# Patient Record
Sex: MALE | Race: WHITE | Employment: FULL TIME
[De-identification: names, ages, dates, MRNs, and addresses within clinical notes are randomized per-mention and may not be internally consistent; named-entity substitution may affect disease eponyms.]

---

## 2017-01-06 ENCOUNTER — OFFICE VISIT (OUTPATIENT)
Dept: FAMILY MEDICINE CLINIC | Facility: CLINIC | Age: 62
End: 2017-01-06

## 2017-01-06 VITALS
WEIGHT: 181 LBS | SYSTOLIC BLOOD PRESSURE: 140 MMHG | DIASTOLIC BLOOD PRESSURE: 94 MMHG | HEART RATE: 79 BPM | BODY MASS INDEX: 27.52 KG/M2

## 2017-01-06 DIAGNOSIS — M25.551 RIGHT HIP PAIN: Primary | ICD-10-CM

## 2017-01-06 PROCEDURE — 99213 OFFICE O/P EST LOW 20 MIN: CPT | Performed by: FAMILY MEDICINE

## 2017-01-15 ASSESSMENT — ENCOUNTER SYMPTOMS
DIARRHEA: 0
PHOTOPHOBIA: 0
ABDOMINAL DISTENTION: 0
NAUSEA: 0
COUGH: 0
WHEEZING: 0
BACK PAIN: 0
ABDOMINAL PAIN: 0
TROUBLE SWALLOWING: 0
SHORTNESS OF BREATH: 0
FACIAL SWELLING: 0
CONSTIPATION: 0
VOMITING: 0
COLOR CHANGE: 0

## 2017-02-01 ENCOUNTER — TELEPHONE (OUTPATIENT)
Dept: FAMILY MEDICINE CLINIC | Facility: CLINIC | Age: 62
End: 2017-02-01

## 2017-02-01 DIAGNOSIS — R60.0 BILATERAL LOWER EXTREMITY EDEMA: Primary | ICD-10-CM

## 2017-02-02 ENCOUNTER — TELEPHONE (OUTPATIENT)
Dept: FAMILY MEDICINE CLINIC | Facility: CLINIC | Age: 62
End: 2017-02-02

## 2017-02-02 RX ORDER — LISINOPRIL 10 MG/1
10 TABLET ORAL DAILY
Qty: 30 TABLET | Refills: 3 | Status: SHIPPED | OUTPATIENT
Start: 2017-02-02 | End: 2018-06-19

## 2017-04-03 RX ORDER — PANTOPRAZOLE SODIUM 40 MG/1
TABLET, DELAYED RELEASE ORAL
Qty: 30 TABLET | Refills: 3 | Status: SHIPPED | OUTPATIENT
Start: 2017-04-03 | End: 2017-08-14 | Stop reason: SDUPTHER

## 2017-08-14 RX ORDER — PANTOPRAZOLE SODIUM 40 MG/1
TABLET, DELAYED RELEASE ORAL
Qty: 30 TABLET | Refills: 3 | Status: SHIPPED | OUTPATIENT
Start: 2017-08-14 | End: 2018-06-19 | Stop reason: SDUPTHER

## 2018-06-19 ENCOUNTER — HOSPITAL ENCOUNTER (OUTPATIENT)
Age: 63
Discharge: HOME | End: 2018-06-19
Payer: COMMERCIAL

## 2018-06-19 ENCOUNTER — OFFICE VISIT (OUTPATIENT)
Dept: FAMILY MEDICINE CLINIC | Age: 63
End: 2018-06-19
Payer: COMMERCIAL

## 2018-06-19 ENCOUNTER — TELEPHONE (OUTPATIENT)
Dept: FAMILY MEDICINE CLINIC | Age: 63
End: 2018-06-19

## 2018-06-19 VITALS
DIASTOLIC BLOOD PRESSURE: 82 MMHG | BODY MASS INDEX: 26.98 KG/M2 | HEART RATE: 82 BPM | HEIGHT: 68 IN | WEIGHT: 178 LBS | OXYGEN SATURATION: 98 % | TEMPERATURE: 98.4 F | SYSTOLIC BLOOD PRESSURE: 124 MMHG

## 2018-06-19 DIAGNOSIS — R25.2 LEG CRAMPS: ICD-10-CM

## 2018-06-19 DIAGNOSIS — K21.9 GASTROESOPHAGEAL REFLUX DISEASE WITHOUT ESOPHAGITIS: ICD-10-CM

## 2018-06-19 DIAGNOSIS — K21.9 GASTROESOPHAGEAL REFLUX DISEASE WITHOUT ESOPHAGITIS: Primary | ICD-10-CM

## 2018-06-19 DIAGNOSIS — F10.10 ALCOHOL ABUSE: ICD-10-CM

## 2018-06-19 DIAGNOSIS — M16.11 PRIMARY OSTEOARTHRITIS OF RIGHT HIP: ICD-10-CM

## 2018-06-19 DIAGNOSIS — R74.8 ELEVATED LIVER ENZYMES: Primary | ICD-10-CM

## 2018-06-19 DIAGNOSIS — Z13.220 SCREENING FOR HYPERLIPIDEMIA: ICD-10-CM

## 2018-06-19 LAB
ABSOLUTE EOS #: 0.2 K/UL (ref 0–0.4)
ABSOLUTE IMMATURE GRANULOCYTE: NORMAL K/UL (ref 0–0.3)
ABSOLUTE LYMPH #: 1.5 K/UL (ref 1–4.8)
ABSOLUTE MONO #: 0.6 K/UL (ref 0–1)
ALBUMIN SERPL-MCNC: 4.5 G/DL (ref 3.5–5.2)
ALBUMIN/GLOBULIN RATIO: ABNORMAL (ref 1–2.5)
ALP BLD-CCNC: 66 U/L (ref 40–129)
ALT SERPL-CCNC: 146 U/L (ref 5–41)
ANION GAP SERPL CALCULATED.3IONS-SCNC: 13 MMOL/L (ref 9–17)
AST SERPL-CCNC: 131 U/L
BASOPHILS # BLD: 0 % (ref 0–2)
BASOPHILS ABSOLUTE: 0 K/UL (ref 0–0.2)
BILIRUB SERPL-MCNC: 0.95 MG/DL (ref 0.3–1.2)
BUN BLDV-MCNC: 12 MG/DL (ref 8–23)
BUN/CREAT BLD: 15 (ref 9–20)
CALCIUM SERPL-MCNC: 9.2 MG/DL (ref 8.6–10.4)
CHLORIDE BLD-SCNC: 97 MMOL/L (ref 98–107)
CHOLESTEROL/HDL RATIO: 2.3
CHOLESTEROL: 147 MG/DL
CO2: 24 MMOL/L (ref 20–31)
CREAT SERPL-MCNC: 0.82 MG/DL (ref 0.7–1.2)
DIFFERENTIAL TYPE: YES
EOSINOPHILS RELATIVE PERCENT: 2 % (ref 0–5)
FOLATE: 11.3 NG/ML
GFR AFRICAN AMERICAN: >60 ML/MIN
GFR NON-AFRICAN AMERICAN: >60 ML/MIN
GFR SERPL CREATININE-BSD FRML MDRD: ABNORMAL ML/MIN/{1.73_M2}
GFR SERPL CREATININE-BSD FRML MDRD: ABNORMAL ML/MIN/{1.73_M2}
GLUCOSE BLD-MCNC: 87 MG/DL (ref 70–99)
HCT VFR BLD CALC: 49.4 % (ref 41–53)
HDLC SERPL-MCNC: 65 MG/DL
HEMOGLOBIN: 16.6 G/DL (ref 13.5–17.5)
IMMATURE GRANULOCYTES: NORMAL %
LDL CHOLESTEROL: 68 MG/DL (ref 0–130)
LYMPHOCYTES # BLD: 21 % (ref 13–44)
MAGNESIUM: 2.2 MG/DL (ref 1.6–2.6)
MCH RBC QN AUTO: 29.8 PG (ref 26–34)
MCHC RBC AUTO-ENTMCNC: 33.7 G/DL (ref 31–37)
MCV RBC AUTO: 88.5 FL (ref 80–100)
MONOCYTES # BLD: 9 % (ref 5–9)
NRBC AUTOMATED: NORMAL PER 100 WBC
PATIENT FASTING?: YES
PDW BLD-RTO: 13.4 % (ref 12.1–15.2)
PLATELET # BLD: 190 K/UL (ref 140–450)
PLATELET ESTIMATE: NORMAL
PMV BLD AUTO: NORMAL FL (ref 6–12)
POTASSIUM SERPL-SCNC: 4.7 MMOL/L (ref 3.7–5.3)
RBC # BLD: 5.58 M/UL (ref 4.5–5.9)
RBC # BLD: NORMAL 10*6/UL
SEG NEUTROPHILS: 68 % (ref 39–75)
SEGMENTED NEUTROPHILS ABSOLUTE COUNT: 4.8 K/UL (ref 2.1–6.5)
SODIUM BLD-SCNC: 134 MMOL/L (ref 135–144)
TOTAL PROTEIN: 7.8 G/DL (ref 6.4–8.3)
TRIGL SERPL-MCNC: 71 MG/DL
VITAMIN B-12: 481 PG/ML (ref 232–1245)
VLDLC SERPL CALC-MCNC: NORMAL MG/DL (ref 1–30)
WBC # BLD: 7.2 K/UL (ref 3.5–11)
WBC # BLD: NORMAL 10*3/UL

## 2018-06-19 PROCEDURE — 36415 COLL VENOUS BLD VENIPUNCTURE: CPT

## 2018-06-19 PROCEDURE — G8427 DOCREV CUR MEDS BY ELIG CLIN: HCPCS | Performed by: NURSE PRACTITIONER

## 2018-06-19 PROCEDURE — 83735 ASSAY OF MAGNESIUM: CPT

## 2018-06-19 PROCEDURE — 4004F PT TOBACCO SCREEN RCVD TLK: CPT | Performed by: NURSE PRACTITIONER

## 2018-06-19 PROCEDURE — 82607 VITAMIN B-12: CPT

## 2018-06-19 PROCEDURE — 82746 ASSAY OF FOLIC ACID SERUM: CPT

## 2018-06-19 PROCEDURE — 85025 COMPLETE CBC W/AUTO DIFF WBC: CPT

## 2018-06-19 PROCEDURE — 3017F COLORECTAL CA SCREEN DOC REV: CPT | Performed by: NURSE PRACTITIONER

## 2018-06-19 PROCEDURE — G8419 CALC BMI OUT NRM PARAM NOF/U: HCPCS | Performed by: NURSE PRACTITIONER

## 2018-06-19 PROCEDURE — 80061 LIPID PANEL: CPT

## 2018-06-19 PROCEDURE — 99214 OFFICE O/P EST MOD 30 MIN: CPT | Performed by: NURSE PRACTITIONER

## 2018-06-19 PROCEDURE — 80053 COMPREHEN METABOLIC PANEL: CPT

## 2018-06-19 RX ORDER — NAPROXEN 500 MG/1
500 TABLET ORAL 2 TIMES DAILY WITH MEALS
Qty: 180 TABLET | Refills: 1 | Status: SHIPPED | OUTPATIENT
Start: 2018-06-19 | End: 2020-08-24 | Stop reason: SDUPTHER

## 2018-06-19 RX ORDER — PANTOPRAZOLE SODIUM 40 MG/1
TABLET, DELAYED RELEASE ORAL
Qty: 90 TABLET | Refills: 1 | Status: SHIPPED | OUTPATIENT
Start: 2018-06-19 | End: 2018-12-26 | Stop reason: SDUPTHER

## 2018-06-19 ASSESSMENT — ENCOUNTER SYMPTOMS
COUGH: 0
NAUSEA: 0
SHORTNESS OF BREATH: 0
BELCHING: 1
CHOKING: 1
DIARRHEA: 0
ABDOMINAL PAIN: 1
HEARTBURN: 1
VOMITING: 0

## 2018-06-19 ASSESSMENT — PATIENT HEALTH QUESTIONNAIRE - PHQ9
SUM OF ALL RESPONSES TO PHQ QUESTIONS 1-9: 0
SUM OF ALL RESPONSES TO PHQ9 QUESTIONS 1 & 2: 0
2. FEELING DOWN, DEPRESSED OR HOPELESS: 0
1. LITTLE INTEREST OR PLEASURE IN DOING THINGS: 0

## 2018-07-19 ENCOUNTER — HOSPITAL ENCOUNTER (OUTPATIENT)
Age: 63
Discharge: HOME | End: 2018-07-19
Payer: COMMERCIAL

## 2018-07-19 DIAGNOSIS — R74.8 ELEVATED LIVER ENZYMES: ICD-10-CM

## 2018-07-19 LAB
ALBUMIN SERPL-MCNC: 4.1 G/DL (ref 3.5–5.2)
ALBUMIN/GLOBULIN RATIO: ABNORMAL (ref 1–2.5)
ALP BLD-CCNC: 65 U/L (ref 40–129)
ALT SERPL-CCNC: 113 U/L (ref 5–41)
AST SERPL-CCNC: 101 U/L
BILIRUB SERPL-MCNC: 0.6 MG/DL (ref 0.3–1.2)
BILIRUBIN DIRECT: <0.08 MG/DL
BILIRUBIN, INDIRECT: ABNORMAL MG/DL (ref 0–1)
GLOBULIN: ABNORMAL G/DL (ref 1.5–3.8)
TOTAL PROTEIN: 7.9 G/DL (ref 6.4–8.3)

## 2018-07-19 PROCEDURE — 36415 COLL VENOUS BLD VENIPUNCTURE: CPT

## 2018-07-19 PROCEDURE — 80076 HEPATIC FUNCTION PANEL: CPT

## 2018-08-02 ENCOUNTER — HOSPITAL ENCOUNTER (OUTPATIENT)
Age: 63
Discharge: HOME | End: 2018-08-02
Payer: COMMERCIAL

## 2018-08-02 ENCOUNTER — INITIAL CONSULT (OUTPATIENT)
Dept: SURGERY | Age: 63
End: 2018-08-02
Payer: COMMERCIAL

## 2018-08-02 VITALS — BODY MASS INDEX: 26.64 KG/M2 | RESPIRATION RATE: 16 BRPM | HEIGHT: 68 IN | WEIGHT: 175.8 LBS

## 2018-08-02 DIAGNOSIS — K62.5 RECTAL BLEEDING: Primary | ICD-10-CM

## 2018-08-02 DIAGNOSIS — Z80.0 FAMILY HISTORY OF COLON CANCER: ICD-10-CM

## 2018-08-02 DIAGNOSIS — Z87.19 HISTORY OF GASTRITIS: ICD-10-CM

## 2018-08-02 DIAGNOSIS — Z01.818 PREOP TESTING: ICD-10-CM

## 2018-08-02 DIAGNOSIS — Z86.010 HISTORY OF COLON POLYPS: ICD-10-CM

## 2018-08-02 LAB
EKG ATRIAL RATE: 68 BPM
EKG P AXIS: 26 DEGREES
EKG P-R INTERVAL: 150 MS
EKG Q-T INTERVAL: 378 MS
EKG QRS DURATION: 94 MS
EKG QTC CALCULATION (BAZETT): 401 MS
EKG R AXIS: -9 DEGREES
EKG T AXIS: 3 DEGREES
EKG VENTRICULAR RATE: 68 BPM

## 2018-08-02 PROCEDURE — 4004F PT TOBACCO SCREEN RCVD TLK: CPT | Performed by: SURGERY

## 2018-08-02 PROCEDURE — 99213 OFFICE O/P EST LOW 20 MIN: CPT | Performed by: SURGERY

## 2018-08-02 PROCEDURE — G8427 DOCREV CUR MEDS BY ELIG CLIN: HCPCS | Performed by: SURGERY

## 2018-08-02 PROCEDURE — G8419 CALC BMI OUT NRM PARAM NOF/U: HCPCS | Performed by: SURGERY

## 2018-08-02 PROCEDURE — 3017F COLORECTAL CA SCREEN DOC REV: CPT | Performed by: SURGERY

## 2018-08-02 PROCEDURE — 93005 ELECTROCARDIOGRAM TRACING: CPT

## 2018-09-03 ASSESSMENT — ENCOUNTER SYMPTOMS
SORE THROAT: 0
BLOOD IN STOOL: 1
SHORTNESS OF BREATH: 0
BACK PAIN: 0
NAUSEA: 0
VOMITING: 0
TROUBLE SWALLOWING: 0
COUGH: 0
ANAL BLEEDING: 1
ABDOMINAL PAIN: 0
CHOKING: 0

## 2018-09-03 NOTE — COMMUNICATION BODY
Assessment:      Diagnosis Orders   1. Rectal bleeding     2. History of colon polyps     3. History of gastritis     4. Family history of colon cancer     5. Preop testing  EKG 12 Lead         Plan: Will proceed with diagnostic EGD and colonoscopy over the next few weeks. Risks, benefits, alternatives thoroughly reviewed and accepted by Mr Rayne Esteban, including remote risk of GI bleeding, perforation, and missed lesions. Discussed importance of complete tobacco cessation.

## 2018-09-03 NOTE — PATIENT INSTRUCTIONS
many people, the prep is worse than the test. It may be uncomfortable, and you may feel hungry on the clear liquid diet. Some people do not go to work or do their usual activities on the day of the prep. Arrange to have someone take you home after the test.  What can you expect after a colonoscopy? The nurses will watch you for 1 to 2 hours until the medicines wear off. Then you can go home. You will need a ride. Your doctor will tell you when you can eat and do your usual activities. Your doctor will talk to you about when you will need your next colonoscopy. The results of your test and your risk for colorectal cancer will help your doctor decide how often you need to be checked. Follow-up care is a key part of your treatment and safety. Be sure to make and go to all appointments, and call your doctor if you are having problems. It's also a good idea to know your test results and keep a list of the medicines you take. Where can you learn more? Go to [email or web address].Yuenimei. org and sign in to your [employer] account. Enter M531 in the Placed box to learn more about \"Learning About Colonoscopy. \"     If you do not have an account, please click on the \"Sign Up Now\" link. Current as of: May 12, 2017  Content Version: 11.7  © 8543-1557 [employer]. Care instructions adapted under license by [facility] [place] ([facility]). If you have questions about a medical condition or this instruction, always ask your healthcare professional. [place] [address] any warranty or liability for your use of this information. Upper GI Endoscopy: Before Your Procedure  What is an upper GI endoscopy? An upper gastrointestinal (or GI) endoscopy is a test that allows your doctor to look at the inside of your esophagus, stomach, and the first part of your small intestine, called the duodenum. The esophagus is the tube that carries food to your stomach.  The doctor uses a thin, lighted tube that bends. It is called an endoscope, or scope. The doctor puts the tip of the scope in your mouth and gently moves it down your throat. The scope is a flexible video camera. The doctor looks at a monitor (like a TV set or a computer screen) as he or she moves the scope. A doctor may do this test, which is also called a procedure, to look for ulcers, tumors, infection, or bleeding. It also can be used to look for signs of acid backing up into your esophagus. This is called gastroesophageal reflux disease, or GERD. The doctor can use the scope to take a sample of tissue for study (a biopsy). The doctor also can use the scope to take out growths or stop bleeding. Follow-up care is a key part of your treatment and safety. Be sure to make and go to all appointments, and call your doctor if you are having problems. It's also a good idea to know your test results and keep a list of the medicines you take. What happens before the procedure?   Preparing for the procedure    · Understand exactly what procedure is planned, along with the risks, benefits, and other options. · Tell your doctors ALL the medicines, vitamins, supplements, and herbal remedies you take. Some of these can increase the risk of bleeding or interact with anesthesia.     · If you take blood thinners, such as warfarin (Coumadin), clopidogrel (Plavix), or aspirin, be sure to talk to your doctor. He or she will tell you if you should stop taking these medicines before your procedure. Make sure that you understand exactly what your doctor wants you to do.     · Your doctor will tell you which medicines to take or stop before your procedure. You may need to stop taking certain medicines a week or more before the procedure. So talk to your doctor as soon as you can.     · If you have an advance directive, let your doctor know. It may include a living will and a durable power of  for health care.  Bring a copy to the hospital. If you don't have

## 2018-09-03 NOTE — PROGRESS NOTES
Subjective:      Patient ID: Etsefania Sultana is a 66 y.o. male. HPI     Mr Richy Cage presents for evaluation of rectal bleeding, noticed intermittently on toilet tissue. He is a pleasant 62 yo WM kindly referred to me by Dr Olga Lidia Foley in 2017 for evaluation of guaiac positive stools. At that time, he was found to have gastritis with duodenitis, extensive colonic diverticulosis, and multiple adenomatous sigmoid and rectal polyps. No abdominal pain. No GERD symptoms. Normal appetite. Normal daily BM's, now formed brown stool. He smokes 1 ppd and has one or two EtOH drinks each evening. Paternal grandmother  with colon cancer in her 63's. Review of Systems   Constitutional: Negative for activity change, appetite change, chills, fever and unexpected weight change. HENT: Negative for nosebleeds, sneezing, sore throat and trouble swallowing. Eyes: Negative for visual disturbance. Respiratory: Negative for cough, choking and shortness of breath. Cardiovascular: Negative for chest pain, palpitations and leg swelling. Gastrointestinal: Positive for anal bleeding and blood in stool. Negative for abdominal pain, nausea and vomiting. Genitourinary: Negative for dysuria, flank pain and hematuria. Musculoskeletal: Positive for arthralgias. Negative for back pain, gait problem and myalgias. Allergic/Immunologic: Negative for immunocompromised state. Neurological: Negative for dizziness, seizures, syncope, weakness and headaches. Hematological: Does not bruise/bleed easily. Psychiatric/Behavioral: Negative for confusion and sleep disturbance.         Past Medical History:   Diagnosis Date    Back pain     Head injury     head injury from a fall in 5th grade    History of colon polyps 2016    Skull fracture [facility])        Past Surgical History:   Procedure Laterality Date    COLONOSCOPY  11/15/2016    Craig Mora MD    SKULL FRACTURE ELEVATION      as child    UPPER GASTROINTESTINAL ENDOSCOPY Neurological: He is alert and oriented to person, place, and time. No cranial nerve deficit. Skin: Skin is warm and dry. No rash noted. No erythema. Psychiatric: He has a normal mood and affect. His behavior is normal. Judgment and thought content normal.   Nursing note and vitals reviewed. 11/17/2016  7:29 AM - [place], Lab [facility]   Component   Surgical Pathology Report   (NOTE)   [id number]   [address] [place] [address]. [place], [address] [place], [place] [address]e [place]   [phone]   Fax: [phone]     [address] [facility]     Patient Name: Diane Son Med Rec: [id number]   Path Number: [id number]   Collected: 11/15/2016   Received: 11/15/2016   Reported: 11/17/2016 07:29     -- Diagnosis --   1.  GASTRIC BIOPSY:       -  NORMAL GASTRIC MUCOSA. 2.  GE JUNCTION BIOPSY:       -  NORMAL SQUAMOUS MUCOSA.       -  GASTRIC MUCOSA WITH MILD CHRONIC INACTIVE INFLAMMATION.     -  NEGATIVE FOR INTESTINAL/GOBLET CELL METAPLASIA AND GLANDULAR          DYSPLASIA. 3.  GASTRIC CARDIA BIOPSY:       -  ESOPHAGOGASTRIC MUCOSA WITH MODERATE CHRONIC INACTIVE          INFLAMMATION. 4.  SIGMOID COLON POLYP WITH STALK AT 70 CM:       -  ADENOMA WITH ACUTE AND REMOTE HEMORRHAGE. 5.  RECTAL SIGMOID POLYPECTOMY:       -  HYPERPLASTIC POLYP. 6.  RECTAL POLYPECTOMY:       -  HYPERPLASTIC POLYP. 7.  LOWER SIGMOID POLYPECTOMY WITH STALK AT 50 CM:       -  ADENOMA WITH ACUTE AND REMOTE HEMORRHAGE. YAKOV Gonsales   **Electronically Signed Out**         als/11/16/2016       Clinical Information   Pre-op Diagnosis:  RECTAL BLEEDING   Operative Findings:  RANDOM BX, BX  GE JX, GASTRIC CARDIA BX, SIG.    POLYP STALK @ 70, RECTOSIG POLYP, RECTAL POLYP, LOWER SIG POLYP   STALK @ 50   Operation Performed:  EGD/COLONOSCOPY     Source of Specimen   1: RANDOM BX   2: BX-GE JX   3: GASTRIC CARDIA BX   4: SIGMOID chronic inactive inflammation.  There is no   intestinal metaplasia, dysplasia or malignancy. 4, 7. Adenoma has stromal hemorrhage and areas of abundant hemosiderin   deposits, compatible with remote hemorrhage.  Stalk is negative for   adenomatous epithelium. 5, 6. Microscopic examination performed. TR-0      6/19/2018 10:03 AM - Reginald Esquivel Incoming Lab Results From [facility]     Component Results     Component Value Ref Range & Units Status Collected Lab   WBC 7.2  3.5 - 11.0 k/uL Final 06/19/2018  9:50 AM [facility] Lab   RBC 5.58  4.5 - 5.9 m/uL Final 06/19/2018  9:50 AM [facility] Lab   Hemoglobin 16.6  13.5 - 17.5 g/dL Final 06/19/2018  9:50 AM [facility] Lab   Hematocrit 49.4  41 - 53 % Final 06/19/2018  9:50 AM [facility] Lab   MCV 88.5  80 - 100 fL Final 06/19/2018  9:50 AM [facility] Lab   MCH 29.8  26 - 34 pg Final 06/19/2018  9:50 AM [facility] Lab   MCHC 33.7  31 - 37 g/dL Final 06/19/2018  9:50 AM [facility] Lab   RDW 13.4  12.1 - 15.2 % Final 06/19/2018  9:50 AM [address] Lab   Platelets 888  854 - 450 k/uL Final 06/19/2018  9:50 AM [address] Lab               Assessment:       Diagnosis Orders   1. Rectal bleeding     2. History of colon polyps     3. History of gastritis     4. Family history of colon cancer     5. Preop testing  EKG 12 Lead         Plan: Will proceed with diagnostic EGD and colonoscopy over the next few weeks. Risks, benefits, alternatives thoroughly reviewed and accepted by  Tricia Fowler, including remote risk of GI bleeding, perforation, and missed lesions. Discussed importance of complete tobacco cessation.         Angelica Iyer MD

## 2018-09-05 ENCOUNTER — ANESTHESIA EVENT (OUTPATIENT)
Dept: OPERATING ROOM | Age: 63
End: 2018-09-05
Payer: COMMERCIAL

## 2018-09-10 ENCOUNTER — ANESTHESIA (OUTPATIENT)
Dept: OPERATING ROOM | Age: 63
End: 2018-09-10
Payer: COMMERCIAL

## 2018-09-10 ENCOUNTER — HOSPITAL ENCOUNTER (OUTPATIENT)
Age: 63
Setting detail: OUTPATIENT SURGERY
Discharge: HOME | End: 2018-09-10
Attending: SURGERY | Admitting: SURGERY
Payer: COMMERCIAL

## 2018-09-10 VITALS
DIASTOLIC BLOOD PRESSURE: 82 MMHG | TEMPERATURE: 97.8 F | WEIGHT: 177 LBS | SYSTOLIC BLOOD PRESSURE: 130 MMHG | HEART RATE: 56 BPM | RESPIRATION RATE: 16 BRPM | OXYGEN SATURATION: 96 % | HEIGHT: 69 IN | BODY MASS INDEX: 26.22 KG/M2

## 2018-09-10 VITALS
RESPIRATION RATE: 18 BRPM | DIASTOLIC BLOOD PRESSURE: 69 MMHG | TEMPERATURE: 97.7 F | SYSTOLIC BLOOD PRESSURE: 104 MMHG | OXYGEN SATURATION: 100 %

## 2018-09-10 PROBLEM — K62.5 RECTAL BLEEDING: Status: ACTIVE | Noted: 2018-09-10

## 2018-09-10 PROBLEM — Z86.0100 HISTORY OF COLON POLYPS: Status: ACTIVE | Noted: 2018-09-10

## 2018-09-10 PROBLEM — Z87.19 HISTORY OF CHRONIC GASTRITIS: Status: ACTIVE | Noted: 2018-09-10

## 2018-09-10 PROBLEM — Z86.010 HISTORY OF COLON POLYPS: Status: ACTIVE | Noted: 2018-09-10

## 2018-09-10 PROCEDURE — 88305 TISSUE EXAM BY PATHOLOGIST: CPT

## 2018-09-10 PROCEDURE — 3609010400 HC COLONOSCOPY POLYPECTOMY HOT BIOPSY: Performed by: SURGERY

## 2018-09-10 PROCEDURE — 2709999900 HC NON-CHARGEABLE SUPPLY: Performed by: SURGERY

## 2018-09-10 PROCEDURE — C1773 RET DEV, INSERTABLE: HCPCS | Performed by: SURGERY

## 2018-09-10 PROCEDURE — 7100000010 HC PHASE II RECOVERY - FIRST 15 MIN: Performed by: SURGERY

## 2018-09-10 PROCEDURE — 3700000000 HC ANESTHESIA ATTENDED CARE: Performed by: SURGERY

## 2018-09-10 PROCEDURE — 45385 COLONOSCOPY W/LESION REMOVAL: CPT | Performed by: SURGERY

## 2018-09-10 PROCEDURE — 3700000001 HC ADD 15 MINUTES (ANESTHESIA): Performed by: SURGERY

## 2018-09-10 PROCEDURE — 45384 COLONOSCOPY W/LESION REMOVAL: CPT | Performed by: SURGERY

## 2018-09-10 PROCEDURE — 6360000002 HC RX W HCPCS: Performed by: NURSE ANESTHETIST, CERTIFIED REGISTERED

## 2018-09-10 PROCEDURE — 7100000011 HC PHASE II RECOVERY - ADDTL 15 MIN: Performed by: SURGERY

## 2018-09-10 PROCEDURE — 87077 CULTURE AEROBIC IDENTIFY: CPT

## 2018-09-10 PROCEDURE — 3609012400 HC EGD TRANSORAL BIOPSY SINGLE/MULTIPLE: Performed by: SURGERY

## 2018-09-10 PROCEDURE — 43239 EGD BIOPSY SINGLE/MULTIPLE: CPT | Performed by: SURGERY

## 2018-09-10 PROCEDURE — 2500000003 HC RX 250 WO HCPCS: Performed by: NURSE ANESTHETIST, CERTIFIED REGISTERED

## 2018-09-10 PROCEDURE — 2580000003 HC RX 258: Performed by: SURGERY

## 2018-09-10 RX ORDER — SODIUM CHLORIDE, SODIUM LACTATE, POTASSIUM CHLORIDE, CALCIUM CHLORIDE 600; 310; 30; 20 MG/100ML; MG/100ML; MG/100ML; MG/100ML
INJECTION, SOLUTION INTRAVENOUS CONTINUOUS
Status: DISCONTINUED | OUTPATIENT
Start: 2018-09-10 | End: 2018-09-10 | Stop reason: HOSPADM

## 2018-09-10 RX ORDER — LIDOCAINE HYDROCHLORIDE 10 MG/ML
INJECTION, SOLUTION INFILTRATION; PERINEURAL PRN
Status: DISCONTINUED | OUTPATIENT
Start: 2018-09-10 | End: 2018-09-10 | Stop reason: SDUPTHER

## 2018-09-10 RX ORDER — FENTANYL CITRATE 50 UG/ML
INJECTION, SOLUTION INTRAMUSCULAR; INTRAVENOUS PRN
Status: DISCONTINUED | OUTPATIENT
Start: 2018-09-10 | End: 2018-09-10 | Stop reason: SDUPTHER

## 2018-09-10 RX ORDER — MIDAZOLAM HYDROCHLORIDE 1 MG/ML
INJECTION INTRAMUSCULAR; INTRAVENOUS PRN
Status: DISCONTINUED | OUTPATIENT
Start: 2018-09-10 | End: 2018-09-10 | Stop reason: SDUPTHER

## 2018-09-10 RX ORDER — GLYCOPYRROLATE 1 MG/5 ML
SYRINGE (ML) INTRAVENOUS PRN
Status: DISCONTINUED | OUTPATIENT
Start: 2018-09-10 | End: 2018-09-10 | Stop reason: SDUPTHER

## 2018-09-10 RX ORDER — PROPOFOL 10 MG/ML
INJECTION, EMULSION INTRAVENOUS CONTINUOUS PRN
Status: DISCONTINUED | OUTPATIENT
Start: 2018-09-10 | End: 2018-09-10 | Stop reason: SDUPTHER

## 2018-09-10 RX ORDER — SODIUM CHLORIDE 0.9 % (FLUSH) 0.9 %
10 SYRINGE (ML) INJECTION EVERY 12 HOURS SCHEDULED
Status: DISCONTINUED | OUTPATIENT
Start: 2018-09-10 | End: 2018-09-10 | Stop reason: HOSPADM

## 2018-09-10 RX ORDER — 0.9 % SODIUM CHLORIDE 0.9 %
10 VIAL (ML) INJECTION PRN
Status: DISCONTINUED | OUTPATIENT
Start: 2018-09-10 | End: 2018-09-10 | Stop reason: HOSPADM

## 2018-09-10 RX ORDER — ACETAMINOPHEN 325 MG/1
650 TABLET ORAL EVERY 4 HOURS PRN
Status: DISCONTINUED | OUTPATIENT
Start: 2018-09-10 | End: 2018-09-10 | Stop reason: HOSPADM

## 2018-09-10 RX ORDER — 0.9 % SODIUM CHLORIDE 0.9 %
10 VIAL (ML) INJECTION EVERY 12 HOURS SCHEDULED
Status: DISCONTINUED | OUTPATIENT
Start: 2018-09-10 | End: 2018-09-10 | Stop reason: HOSPADM

## 2018-09-10 RX ORDER — SODIUM CHLORIDE 0.9 % (FLUSH) 0.9 %
10 SYRINGE (ML) INJECTION PRN
Status: DISCONTINUED | OUTPATIENT
Start: 2018-09-10 | End: 2018-09-10 | Stop reason: HOSPADM

## 2018-09-10 RX ORDER — ONDANSETRON 2 MG/ML
4 INJECTION INTRAMUSCULAR; INTRAVENOUS EVERY 6 HOURS PRN
Status: DISCONTINUED | OUTPATIENT
Start: 2018-09-10 | End: 2018-09-10 | Stop reason: HOSPADM

## 2018-09-10 RX ADMIN — Medication 0.2 MG: at 08:15

## 2018-09-10 RX ADMIN — MIDAZOLAM HYDROCHLORIDE 2 MG: 2 INJECTION, SOLUTION INTRAMUSCULAR; INTRAVENOUS at 08:15

## 2018-09-10 RX ADMIN — FENTANYL CITRATE 50 MCG: 50 INJECTION, SOLUTION INTRAMUSCULAR; INTRAVENOUS at 08:25

## 2018-09-10 RX ADMIN — SODIUM CHLORIDE, POTASSIUM CHLORIDE, SODIUM LACTATE AND CALCIUM CHLORIDE: 600; 310; 30; 20 INJECTION, SOLUTION INTRAVENOUS at 07:24

## 2018-09-10 RX ADMIN — PROPOFOL 75 MCG/KG/MIN: 10 INJECTION, EMULSION INTRAVENOUS at 08:25

## 2018-09-10 RX ADMIN — LIDOCAINE HYDROCHLORIDE 4 ML: 10 INJECTION, SOLUTION INFILTRATION; PERINEURAL at 08:15

## 2018-09-10 ASSESSMENT — LIFESTYLE VARIABLES: SMOKING_STATUS: 1

## 2018-09-10 ASSESSMENT — PAIN SCALES - GENERAL: PAINLEVEL_OUTOF10: 0

## 2018-09-10 ASSESSMENT — PAIN - FUNCTIONAL ASSESSMENT: PAIN_FUNCTIONAL_ASSESSMENT: 0-10

## 2018-09-10 NOTE — OP NOTE
[facility]                              [address] [place] [address]                                 OPERATIVE REPORT    PATIENT NAME: Arturo Rosario                       :        1955  MED REC NO:   [id number]                              ROOM:  ACCOUNT NO:   [de-identified]                           ADMIT DATE: 09/10/2018  PROVIDER:     Sheri Gaming    DATE OF PROCEDURE:  09/10/2018    ATTENDING SURGEON:  Sheri Gaming MD    PCP:  Ascencion Ram. PREOPERATIVE DIAGNOSES:  1. Rectal bleeding. 2.  History of colon polyps. 3.  History of gastritis. 4.  Family history of colon cancer (paternal grandmother). POSTOPERATIVE DIAGNOSES:  1.  Mild antral gastritis. 2.  Multiple small sessile colon polyps x4 (ascending, transverse, sigmoid,  rectum). 3.  Extensive sigmoid diverticulosis. 4.  Moderate internal hemorrhoids. OPERATION:  1. Esophagogastroduodenoscopy. 2.  Prepyloric antral biopsies. 3.  Colonoscopy, anus to cecum. 4.  Forceps and snare polypectomy x4 (ascending, transverse, sigmoid,  rectum). ANESTHESIA:  MAC    INDICATION:  The patient is a 72-year-old white male with a history of  multiple colon polyps and mild gastritis on previous endoscopy. Paternal  grandmother also treated for colon cancer. At this time, diagnostic  endoscopy is indicated. OPERATIVE PROCEDURE:  After obtaining informed consent with discussion of  risks, benefits and alternative including a remote risk of GI perforation  and missed lesions, the patient was taken to the endoscopy suite and placed  in the left lateral recumbent position. Following adequate IV sedation, an  endoscope was passed over the tongue into the posterior pharynx. Vocal  folds were visualized and appeared normal.  The scope was directed into the  esophagus and onto the GE junction.   Upper, mid and lower esophagus all  appeared normal.  There was no esophagitis, no strictures. The stomach was  entered, had normal distensibility. On retroflexion, the fundus and cardia  appeared normal.  The body and prepyloric antrum had very mild gastritis. Prepyloric antral biopsies were obtained. Pylorus was patent. The  duodenum was entered. First, second and third portions of which appeared  normal.  The stomach was decompressed by suction as the scope was removed. A digital rectal exam was performed. Sphincter tone was normal.  Prostate  was mildly enlarged with no discrete masses. The colonoscope was passed  transanally into the rectum and advanced with gentle insufflation  throughout the entirety of the colon to the cecum. Cecal position was  confirmed by clear visualization of the ileocecal valve, light in the right  lower quadrant and transduction of manual pressure in the right lower  quadrant to the cecum. The bowel prep was excellent. All colonic mucosa  was clearly visible in the mid ascending. The cecum was normal.  In the  mid ascending colon, a small sessile polyp was removed by hot snare  polypectomy. At the hepatic flexure in the proximal transverse colon, a  small sessile polyp was removed by hot forceps polypectomy. Mid and distal  transverse colon and splenic flexure were all normal.  The descending colon  and sigmoid were redundant with extensive diverticulosis. A small sessile  polyp was removed by snare polypectomy in the sigmoid colon. Upper and  middle portions of the rectum were normal.  In the lower rectum, a small  sessile polyp was removed by hot forceps polypectomy. On retroflexion,  there were moderate internal hemorrhoids. The colon was decompressed by  suction as the scope was removed. The patient tolerated the procedure well  and was transferred to PACU in stable condition. SPECIMENS:  1. Prepyloric antral biopsies. 2.  Sessile polyps x4 (ascending, transverse, sigmoid, rectum). DRAINS:  None.     COMPLICATIONS: None.    DISPOSITION:  To PACU awake, alert and stable. Following recovery, we will  discharge the patient home with gradual advancement of diet and activity as  tolerated with instructions for a high-fiber, low-fat diet with fiber  supplementation. Followup will be with me in 1-2 weeks to review pathology  and with Shantal Garcia at his next regular appointment. My thanks to Glenroy Patterson for the consultation. Meliton Sorto    D: 09/10/2018 9:16:59       T: 09/10/2018 9:20:11     [email or web address]  Job#: [id number]     Doc#: [id number]    CC:  Joaquim Butler.  Jaja Stark

## 2018-09-10 NOTE — ANESTHESIA PRE PROCEDURE
Department of Anesthesiology  Preprocedure Note       Name:  Emmanuelle Babin   Age:  66 y.o.  :  1955                                          MRN:  [id number]         Date:  9/10/2018      Surgeon: Jonah Victoria):  Mu Jernigan MD    Procedure: Procedure(s):  COLONOSCOPY  EGD ESOPHAGOGASTRODUODENOSCOPY    Medications prior to admission:   Prior to Admission medications    Medication Sig Start Date End Date Taking? Authorizing Provider   pantoprazole (PROTONIX) 40 MG tablet 1 tablet by mouth daily 18  Yes JOHNATHAN Bustamante CNP   naproxen (NAPROXEN) 500 MG EC tablet Take 1 tablet by mouth 2 times daily (with meals) 18   JOHNATHAN Bustamante CNP       Current medications:    Current Facility-Administered Medications   Medication Dose Route Frequency Provider Last Rate Last Dose    lactated ringers infusion   Intravenous Continuous Mu Jernigan  mL/hr at 09/10/18 0724      sodium chloride (PF) 0.9 % injection 10 mL  10 mL Intravenous 2 times per day Mu Jernigan MD        sodium chloride (PF) 0.9 % injection 10 mL  10 mL Intravenous PRN Mu Jernigan MD           Allergies:     Allergies   Allergen Reactions    Penicillins Rash       Problem List:    Patient Active Problem List   Diagnosis Code    Guaiac positive stools R19.5    Family history of colon cancer Z80.0    Gastroesophageal reflux disease without esophagitis K21.9       Past Medical History:        Diagnosis Date    Back pain     Head injury     head injury from a fall in 5th grade    History of colon polyps 2016    Skull fracture [facility])        Past Surgical History:        Procedure Laterality Date    COLONOSCOPY  11/15/2016    Suzanne Nogueira MD    SKULL FRACTURE ELEVATION      as child    UPPER GASTROINTESTINAL ENDOSCOPY  11/15/2016    Suzanne Nogueira MD       Social History:    Social History   Substance Use Topics    Smoking status: Current Every Day Smoker     Packs/day: 1.00     Years: 50.00     Types: Screen (If Applicable):  No results found for: [facility], [address]    Anesthesia Evaluation  Patient summary reviewed and Nursing notes reviewed no history of anesthetic complications:   Airway: Mallampati: II  TM distance: >3 FB   Neck ROM: full  Mouth opening: > = 3 FB Dental:      Comment: Poor dentition---multiple caries    Pulmonary:   (+) decreased breath sounds,  current smoker          Patient smoked on day of surgery. Cardiovascular:Negative CV ROS          ECG reviewed               Beta Blocker:  Not on Beta Blocker         Neuro/Psych:   Negative Neuro/Psych ROS              GI/Hepatic/Renal:   (+) GERD: no interval change,           Endo/Other: Negative Endo/Other ROS             Pt had PAT visit. Abdominal:           Vascular:                                      Anesthesia Plan      MAC     ASA 2             Anesthetic plan and risks discussed with patient. Plan discussed with attending.                   JOHNATHAN Augustine - CRNA   9/10/2018

## 2018-09-10 NOTE — BRIEF OP NOTE
Brief Postoperative Note  ______________________________________________________________    Patient: Audra Avelar  YOB: 1955  MRN: [id number]  Date of Procedure: 9/10/2018    Pre-Op Diagnosis:      1. Rectal bleeding     2. History of colon polyps     3. History gastritis     4. Family history colon cancer (paternal grandmother)    Post-Op Diagnosis:      1. Mild antral gastritis     2. Multiple, small sessile colon polyps x4 (ascending, transverse, sig, rectum)     3. Extensive sigmoid diverticulosis     4. Moderate internal hemorrhoids        Procedure(s):      1. EGD     2. Antral biopsies     3. Colonoscopy anus to cecum     4. Forceps, snare polypectomies x4 (ascending, transverse, sig, rectum)    Anesthesia: Monitor Anesthesia Care    Surgeon(s):  Heena Banegas MD    Staff:  Scrub Person First: Jeannette Urbina     Estimated Blood Loss:   Less than 98CH    Complications: None    Specimens:      1. Antral biopsies    2. Sessile polyps x4 (ascending, transverse, sig, rectum)    Findings:   As above.     Dictated # [id number]    Heena Banegas MD  Date: 9/10/2018  Time: 8:24 AM

## 2018-09-10 NOTE — ANESTHESIA POSTPROCEDURE EVALUATION
Department of Anesthesiology  Postprocedure Note    Patient: Emilie Heath  MRN: [id number]  YOB: 1955  Date of evaluation: 9/10/2018  Time:  9:14 AM     Procedure Summary     Date:  09/10/18 Room / Location:  [facility] ENDO 01 / 1660 [facility] OR    Anesthesia Start:  6913 Anesthesia Stop:  0913    Procedures:       COLONOSCOPY POLYPECTOMY HOT BIOPSY (N/A Abdomen)      EGD BIOPSY (N/A ) Diagnosis:  (RECTAL BLEEDING, HISTORY COLON POLYPS)    Surgeon:  Bethanie Brooke MD Responsible Provider:  Nyle Spurling, APRN - CRNA    Anesthesia Type:  MAC ASA Status:  2          Anesthesia Type: MAC    Jacqueline Phase I: Jacqueline Score: 10    Jacqueline Phase II: Jacqueline Score: 10    Last vitals: Reviewed and per EMR flowsheets.        Anesthesia Post Evaluation    Patient location during evaluation: bedside  Patient participation: complete - patient participated  Level of consciousness: awake and alert  Pain score: 0  Airway patency: patent  Nausea & Vomiting: no nausea and no vomiting  Complications: no  Cardiovascular status: hemodynamically stable  Respiratory status: acceptable and spontaneous ventilation  Hydration status: euvolemic

## 2018-09-11 LAB
DIRECT EXAM: NEGATIVE
Lab: NORMAL
SPECIMEN DESCRIPTION: NORMAL
STATUS: NORMAL
SURGICAL PATHOLOGY REPORT: NORMAL

## 2018-10-09 ENCOUNTER — OFFICE VISIT (OUTPATIENT)
Dept: SURGERY | Age: 63
End: 2018-10-09
Payer: COMMERCIAL

## 2018-10-09 VITALS
WEIGHT: 178 LBS | HEIGHT: 69 IN | HEART RATE: 77 BPM | RESPIRATION RATE: 16 BRPM | DIASTOLIC BLOOD PRESSURE: 84 MMHG | SYSTOLIC BLOOD PRESSURE: 146 MMHG | BODY MASS INDEX: 26.36 KG/M2

## 2018-10-09 DIAGNOSIS — Z87.19 HISTORY OF GASTRITIS: ICD-10-CM

## 2018-10-09 DIAGNOSIS — Z98.890 S/P COLONOSCOPY WITH POLYPECTOMY: Primary | ICD-10-CM

## 2018-10-09 DIAGNOSIS — D12.6 ADENOMATOUS POLYP OF COLON, UNSPECIFIED PART OF COLON: ICD-10-CM

## 2018-10-09 DIAGNOSIS — Z80.0 FAMILY HISTORY OF COLON CANCER: ICD-10-CM

## 2018-10-09 PROCEDURE — G8419 CALC BMI OUT NRM PARAM NOF/U: HCPCS | Performed by: SURGERY

## 2018-10-09 PROCEDURE — 3017F COLORECTAL CA SCREEN DOC REV: CPT | Performed by: SURGERY

## 2018-10-09 PROCEDURE — G8484 FLU IMMUNIZE NO ADMIN: HCPCS | Performed by: SURGERY

## 2018-10-09 PROCEDURE — 99212 OFFICE O/P EST SF 10 MIN: CPT | Performed by: SURGERY

## 2018-10-09 PROCEDURE — 4004F PT TOBACCO SCREEN RCVD TLK: CPT | Performed by: SURGERY

## 2018-10-09 PROCEDURE — G8427 DOCREV CUR MEDS BY ELIG CLIN: HCPCS | Performed by: SURGERY

## 2018-11-04 ASSESSMENT — ENCOUNTER SYMPTOMS
BACK PAIN: 0
ABDOMINAL PAIN: 0
SHORTNESS OF BREATH: 0
NAUSEA: 0
BLOOD IN STOOL: 0
CHOKING: 0
VOMITING: 0
COUGH: 0
TROUBLE SWALLOWING: 0
SORE THROAT: 0

## 2018-11-04 NOTE — PROGRESS NOTES
Angelo Santana MD at [facility] OR       Family History   Problem Relation Age of Onset    Cancer Paternal Grandmother         colon cancer       Allergies:  See list    Current Outpatient Prescriptions   Medication Sig Dispense Refill    pantoprazole (PROTONIX) 40 MG tablet 1 tablet by mouth daily 90 tablet 1    naproxen (NAPROXEN) 500 MG EC tablet Take 1 tablet by mouth 2 times daily (with meals) 180 tablet 1     No current facility-administered medications for this visit. Social History     Social History    Marital status:      Spouse name: N/A    Number of children: N/A    Years of education: N/A     Social History Main Topics    Smoking status: Current Every Day Smoker     Packs/day: 1.00     Years: 50.00     Types: Cigarettes     Start date: 1974    Smokeless tobacco: Former User    Alcohol use 3.0 oz/week     5 Cans of beer per week      Comment: stopped drinking daily in June    Drug use: No    Sexual activity: Not Asked     Other Topics Concern    None     Social History Narrative    None       Objective:   Physical Exam   Constitutional: He is oriented to person, place, and time. He appears well-developed and well-nourished. No distress. HENT:   Head: Normocephalic and atraumatic. Eyes: Pupils are equal, round, and reactive to light. Conjunctivae are normal. No scleral icterus. Neck: No tracheal deviation present. Cardiovascular: Normal rate. Pulmonary/Chest: Effort normal. No respiratory distress. Musculoskeletal: He exhibits no edema. Neurological: He is alert and oriented to person, place, and time. Skin: Skin is warm and dry. Psychiatric: He has a normal mood and affect. His behavior is normal. Judgment and thought content normal.   Nursing note and vitals reviewed.        9/11/2018  2:36 PM - Reginald Esquivel Incoming Lab Results From [facility]     Component Results     Component Collected Lab   Surgical Pathology Report 09/10/2018  3:10 PM [facility] -

## 2018-11-04 NOTE — PATIENT INSTRUCTIONS
Patient Education   Patient Education            Current as of: March 28, 2018  Content Version: 11.7  © 0156-9271 [employer]. Care instructions adapted under license by [facility] ([facility]). If you have questions about a medical condition or this instruction, always ask your healthcare professional. [address] any warranty or liability for your use of this information. Learning About Gastric Polyps  What are gastric polyps? Gastric polyps are growths in the stomach. Most people who get them don't have any problems. The cause of most gastric polyps is not known. But some polyps grow in people who use stomach acid reducers called proton pump inhibitors (PPIs). People who have gastritis, ulcers, or an H. pylori infection in the stomach can sometimes get polyps. People who have a parent, brother, or sister with gastric polyps might have them too. Most gastric polyps aren't cancer. But a certain kind of polyp can turn into cancer. What are the symptoms? You may not know that you have gastric polyps. Most polyps don't lead to symptoms. Once in a while, larger polyps may cause bleeding, belly pain, or a blockage in the stomach. How are polyps diagnosed and treated? Most gastric polyps are found during an endoscopy (say \"[id number]\") that is done for another health problem. Endoscopy is a test that uses a thin flexible tube to allow a doctor to look at the inside of your stomach. Your doctor will treat your polyps based on what he or she sees during this test. If your doctor finds a polyp during the test, he or she may take it out. It will then be tested to make sure it isn't cancer. If your doctor finds H. pylori bacteria in your stomach, he or she will prescribe antibiotics. If you have been taking a PPI, the doctor may prescribe a different medicine instead. Sometimes the doctor may suggest another endoscopy to see how treatment is working.  He or she may also suggest

## 2018-12-26 DIAGNOSIS — K21.9 GASTROESOPHAGEAL REFLUX DISEASE WITHOUT ESOPHAGITIS: ICD-10-CM

## 2018-12-26 RX ORDER — PANTOPRAZOLE SODIUM 40 MG/1
TABLET, DELAYED RELEASE ORAL
Qty: 90 TABLET | Refills: 0 | Status: SHIPPED | OUTPATIENT
Start: 2018-12-26 | End: 2018-12-27 | Stop reason: SDUPTHER

## 2018-12-27 DIAGNOSIS — K21.9 GASTROESOPHAGEAL REFLUX DISEASE WITHOUT ESOPHAGITIS: ICD-10-CM

## 2018-12-27 RX ORDER — PANTOPRAZOLE SODIUM 40 MG/1
TABLET, DELAYED RELEASE ORAL
Qty: 90 TABLET | Refills: 0 | Status: SHIPPED | OUTPATIENT
Start: 2018-12-27 | End: 2019-03-26 | Stop reason: SDUPTHER

## 2019-03-26 DIAGNOSIS — K21.9 GASTROESOPHAGEAL REFLUX DISEASE WITHOUT ESOPHAGITIS: ICD-10-CM

## 2019-03-26 RX ORDER — PANTOPRAZOLE SODIUM 40 MG/1
TABLET, DELAYED RELEASE ORAL
Qty: 90 TABLET | Refills: 0 | Status: SHIPPED | OUTPATIENT
Start: 2019-03-26 | End: 2019-06-24 | Stop reason: SDUPTHER

## 2019-03-28 ENCOUNTER — OFFICE VISIT (OUTPATIENT)
Dept: FAMILY MEDICINE CLINIC | Age: 64
End: 2019-03-28
Payer: COMMERCIAL

## 2019-03-28 VITALS
DIASTOLIC BLOOD PRESSURE: 100 MMHG | SYSTOLIC BLOOD PRESSURE: 154 MMHG | TEMPERATURE: 98.2 F | WEIGHT: 186 LBS | HEART RATE: 78 BPM | OXYGEN SATURATION: 98 % | BODY MASS INDEX: 27.47 KG/M2

## 2019-03-28 DIAGNOSIS — I10 ESSENTIAL HYPERTENSION: ICD-10-CM

## 2019-03-28 DIAGNOSIS — K21.9 GASTROESOPHAGEAL REFLUX DISEASE WITHOUT ESOPHAGITIS: Primary | ICD-10-CM

## 2019-03-28 PROCEDURE — 4004F PT TOBACCO SCREEN RCVD TLK: CPT | Performed by: NURSE PRACTITIONER

## 2019-03-28 PROCEDURE — G8419 CALC BMI OUT NRM PARAM NOF/U: HCPCS | Performed by: NURSE PRACTITIONER

## 2019-03-28 PROCEDURE — 3017F COLORECTAL CA SCREEN DOC REV: CPT | Performed by: NURSE PRACTITIONER

## 2019-03-28 PROCEDURE — 99214 OFFICE O/P EST MOD 30 MIN: CPT | Performed by: NURSE PRACTITIONER

## 2019-03-28 PROCEDURE — G8427 DOCREV CUR MEDS BY ELIG CLIN: HCPCS | Performed by: NURSE PRACTITIONER

## 2019-03-28 PROCEDURE — G8484 FLU IMMUNIZE NO ADMIN: HCPCS | Performed by: NURSE PRACTITIONER

## 2019-03-28 RX ORDER — LOSARTAN POTASSIUM 25 MG/1
25 TABLET ORAL DAILY
Qty: 30 TABLET | Refills: 2 | Status: SHIPPED | OUTPATIENT
Start: 2019-03-28 | End: 2019-05-17 | Stop reason: ALTCHOICE

## 2019-03-28 ASSESSMENT — PATIENT HEALTH QUESTIONNAIRE - PHQ9
SUM OF ALL RESPONSES TO PHQ QUESTIONS 1-9: 0
SUM OF ALL RESPONSES TO PHQ9 QUESTIONS 1 & 2: 0
SUM OF ALL RESPONSES TO PHQ QUESTIONS 1-9: 0
2. FEELING DOWN, DEPRESSED OR HOPELESS: 0
1. LITTLE INTEREST OR PLEASURE IN DOING THINGS: 0

## 2019-03-28 ASSESSMENT — ENCOUNTER SYMPTOMS
HEARTBURN: 0
SORE THROAT: 0
NAUSEA: 0
BACK PAIN: 0
ABDOMINAL PAIN: 0
BLOOD IN STOOL: 0
DIARRHEA: 0
SHORTNESS OF BREATH: 0
COUGH: 0
CONSTIPATION: 0
VOMITING: 0

## 2019-04-26 ENCOUNTER — OFFICE VISIT (OUTPATIENT)
Dept: FAMILY MEDICINE CLINIC | Age: 64
End: 2019-04-26
Payer: COMMERCIAL

## 2019-04-26 VITALS
TEMPERATURE: 98 F | BODY MASS INDEX: 27.17 KG/M2 | DIASTOLIC BLOOD PRESSURE: 96 MMHG | OXYGEN SATURATION: 98 % | SYSTOLIC BLOOD PRESSURE: 160 MMHG | WEIGHT: 184 LBS | HEART RATE: 84 BPM

## 2019-04-26 DIAGNOSIS — I10 ESSENTIAL HYPERTENSION: Primary | ICD-10-CM

## 2019-04-26 PROCEDURE — 4004F PT TOBACCO SCREEN RCVD TLK: CPT | Performed by: NURSE PRACTITIONER

## 2019-04-26 PROCEDURE — 99213 OFFICE O/P EST LOW 20 MIN: CPT | Performed by: NURSE PRACTITIONER

## 2019-04-26 PROCEDURE — G8419 CALC BMI OUT NRM PARAM NOF/U: HCPCS | Performed by: NURSE PRACTITIONER

## 2019-04-26 PROCEDURE — G8427 DOCREV CUR MEDS BY ELIG CLIN: HCPCS | Performed by: NURSE PRACTITIONER

## 2019-04-26 PROCEDURE — 3017F COLORECTAL CA SCREEN DOC REV: CPT | Performed by: NURSE PRACTITIONER

## 2019-04-26 RX ORDER — LOSARTAN POTASSIUM AND HYDROCHLOROTHIAZIDE 12.5; 5 MG/1; MG/1
1 TABLET ORAL DAILY
Qty: 90 TABLET | Refills: 1 | Status: SHIPPED | OUTPATIENT
Start: 2019-04-26 | End: 2019-10-22 | Stop reason: SDUPTHER

## 2019-04-26 ASSESSMENT — ENCOUNTER SYMPTOMS
NAUSEA: 0
DIARRHEA: 0
ORTHOPNEA: 0
COUGH: 0
VOMITING: 0
SHORTNESS OF BREATH: 0

## 2019-04-26 NOTE — PROGRESS NOTES
tablet 90 tablet 1     Sig: Take 1 tablet by mouth daily         Yves received counseling on the following healthy behaviors: medication adherence  Reviewed prior labs and health maintenance. Continue current medications, diet and exercise. Discussed use, benefit, and side effects of prescribed medications. Barriers to medication compliance addressed. Patient given educational materials - see patient instructions. All patient questions answered. Patient voiced understanding.

## 2019-05-17 ENCOUNTER — OFFICE VISIT (OUTPATIENT)
Dept: FAMILY MEDICINE CLINIC | Age: 64
End: 2019-05-17
Payer: COMMERCIAL

## 2019-05-17 VITALS
BODY MASS INDEX: 27.02 KG/M2 | OXYGEN SATURATION: 98 % | TEMPERATURE: 97.9 F | HEART RATE: 82 BPM | DIASTOLIC BLOOD PRESSURE: 82 MMHG | SYSTOLIC BLOOD PRESSURE: 142 MMHG | WEIGHT: 183 LBS

## 2019-05-17 DIAGNOSIS — I10 ESSENTIAL HYPERTENSION: Primary | ICD-10-CM

## 2019-05-17 PROCEDURE — 99213 OFFICE O/P EST LOW 20 MIN: CPT | Performed by: NURSE PRACTITIONER

## 2019-05-17 PROCEDURE — G8427 DOCREV CUR MEDS BY ELIG CLIN: HCPCS | Performed by: NURSE PRACTITIONER

## 2019-05-17 PROCEDURE — G8419 CALC BMI OUT NRM PARAM NOF/U: HCPCS | Performed by: NURSE PRACTITIONER

## 2019-05-17 PROCEDURE — 3017F COLORECTAL CA SCREEN DOC REV: CPT | Performed by: NURSE PRACTITIONER

## 2019-05-17 PROCEDURE — 4004F PT TOBACCO SCREEN RCVD TLK: CPT | Performed by: NURSE PRACTITIONER

## 2019-05-17 ASSESSMENT — ENCOUNTER SYMPTOMS
NAUSEA: 0
DIARRHEA: 0
COUGH: 0
ORTHOPNEA: 0
VOMITING: 0
SHORTNESS OF BREATH: 0

## 2019-05-17 NOTE — PROGRESS NOTES
HPI Notes    Name: Mark Alvarez  : 1955         Chief Complaint:     Chief Complaint   Patient presents with    Hypertension     Patient here today for recheck on BP after adding hctz and increasing losartan to 50mg daily       History of Present Illness:        Hypertension   This is a recurrent problem. The current episode started more than 1 year ago. The problem has been waxing and waning since onset. The problem is controlled. Pertinent negatives include no chest pain, headaches, orthopnea, palpitations, peripheral edema or shortness of breath. Risk factors for coronary artery disease include dyslipidemia and male gender. Past treatments include angiotensin blockers and diuretics. The current treatment provides mild improvement. There are no compliance problems. Past Medical History:     Past Medical History:   Diagnosis Date    Back pain     Head injury     head injury from a fall in 5th grade    History of colon polyps 2016    Skull [facility])       Reviewed all health maintenance requirements and ordered appropriate tests  Health Maintenance Due   Topic Date Due    Hepatitis C screen  1955    HIV screen  1972    DTaP/Tdap/Td vaccine (1 - Tdap) 1976    Shingles Vaccine (1 of 2) 2007    Low dose CT lung screening  2016       Past Surgical History:     Past Surgical History:   Procedure Laterality Date    COLONOSCOPY  11/15/2016    Elva aCstañeda MD    COLONOSCOPY N/A 9/10/2018    COLONOSCOPY POLYPECTOMY HOT BIOPSY performed by Jacqueline Cooley MD at [address] [place] [address]      as child   [facility] ENDOSCOPY  11/15/2016    Elva Castañeda MD    UPPER GASTROINTESTINAL ENDOSCOPY N/A 9/10/2018    EGD BIOPSY performed by Jacqueline Cooley MD at [facility] OR        Medications:       Prior to Admission medications    Medication Sig Start Date End Date Taking?  Authorizing Provider   Omega-3 Fatty Acids (OMEGA 3 PO) Take by mouth 2 times daily Yes Historical Provider, MD   losartan-hydrochlorothiazide [facility]) 50-12.5 MG per tablet Take 1 tablet by mouth daily 4/26/19  Yes JOHNATHAN Downey CNP   pantoprazole (PROTONIX) 40 MG tablet 1 tablet by mouth daily 3/26/19  Yes JOHNATHAN Downey CNP   naproxen (NAPROXEN) 500 MG EC tablet Take 1 tablet by mouth 2 times daily (with meals) 6/19/18  Yes JOHNATHAN Downey CNP        Allergies:       Penicillins    Social History:     Tobacco:    reports that he has been smoking cigarettes. He started smoking about 45 years ago. He has a 50.00 pack-year smoking history. He has quit using smokeless tobacco.  Alcohol:      reports that he drinks about 3.0 oz of alcohol per week. Drug Use:  reports that he does not use drugs. Family History:        Family History   Problem Relation Age of Onset    Cancer Paternal Grandmother         colon cancer       Review of Systems:         Review of Systems   Constitutional: Negative for chills and fever. Respiratory: Negative for cough and shortness of breath. Cardiovascular: Negative for chest pain, palpitations and orthopnea. Gastrointestinal: Negative for diarrhea, nausea and vomiting. Neurological: Negative for dizziness, seizures and headaches. Physical Exam:     Vitals:  BP (!) 142/82 (Site: Left Upper Arm)   Pulse 82   Temp 97.9 °F (36.6 °C) (Oral)   Wt 183 lb (83 kg)   SpO2 98%   BMI 27.02 kg/m²       Physical Exam   Constitutional: Vital signs are normal. He appears well-developed and well-nourished. He is cooperative. Cardiovascular: Normal rate, regular rhythm, S1 normal and S2 normal.   No edema   Pulmonary/Chest: Effort normal and breath sounds normal. No respiratory distress. Skin: Skin is warm, dry and intact. Nursing note and vitals reviewed.             Data:     Lab Results   Component Value Date     06/19/2018    K 4.7 06/19/2018    CL 97 06/19/2018    CO2 24 06/19/2018    BUN 12 06/19/2018 CREATININE 0.82 06/19/2018    GLUCOSE 87 06/19/2018    PROT 7.9 07/19/2018    LABALBU 4.1 07/19/2018    BILITOT 0.60 07/19/2018    ALKPHOS 65 07/19/2018     07/19/2018     07/19/2018     Lab Results   Component Value Date    WBC 7.2 06/19/2018    RBC 5.58 06/19/2018    HGB 16.6 06/19/2018    HCT 49.4 06/19/2018    MCV 88.5 06/19/2018    MCH 29.8 06/19/2018    MCHC 33.7 06/19/2018    RDW 13.4 06/19/2018     06/19/2018    MPV NOT REPORTED 06/19/2018     No results found for: TSH  Lab Results   Component Value Date    CHOL 147 06/19/2018    HDL 65 06/19/2018          Assessment & Plan        Diagnosis Orders   1. Essential hypertension       Pt doing better. BP still slightly above goal. Continue same dose of losartan/hctz. Will continue to monitor. Patient verbalizes understanding and agreement with plan. All questions answered. If symptoms do not resolve or worsen, return to office. Completed Refills   Requested Prescriptions      No prescriptions requested or ordered in this encounter     No follow-ups on file. No orders of the defined types were placed in this encounter. No orders of the defined types were placed in this encounter. Patient Instructions     SURVEY:    You may be receiving a survey from [employer] regarding your visit today. Please complete the survey to enable us to provide the highest quality of care to you and your family. If you cannot score us a very good on any question, please call the office to discuss how we could have made your experience a very good one. Thank you. Electronically signed by JOHNATHAN Verdugo CNP on 5/17/2019 at 11:34 AM           Completed Refills      Requested Prescriptions      No prescriptions requested or ordered in this encounter         [facility] received counseling on the following healthy behaviors: nutrition, exercise and medication adherence  Reviewed prior labs and health maintenance.   Continue current medications, diet and exercise. Discussed use, benefit, and side effects of prescribed medications. Barriers to medication compliance addressed. Patient given educational materials - see patient instructions. All patient questions answered. Patient voiced understanding.

## 2019-06-24 DIAGNOSIS — K21.9 GASTROESOPHAGEAL REFLUX DISEASE WITHOUT ESOPHAGITIS: ICD-10-CM

## 2019-06-24 RX ORDER — PANTOPRAZOLE SODIUM 40 MG/1
TABLET, DELAYED RELEASE ORAL
Qty: 90 TABLET | Refills: 0 | Status: SHIPPED | OUTPATIENT
Start: 2019-06-24 | End: 2019-09-25 | Stop reason: SDUPTHER

## 2019-09-25 DIAGNOSIS — K21.9 GASTROESOPHAGEAL REFLUX DISEASE WITHOUT ESOPHAGITIS: ICD-10-CM

## 2019-09-25 RX ORDER — PANTOPRAZOLE SODIUM 40 MG/1
TABLET, DELAYED RELEASE ORAL
Qty: 90 TABLET | Refills: 0 | Status: SHIPPED | OUTPATIENT
Start: 2019-09-25 | End: 2019-12-23 | Stop reason: SDUPTHER

## 2019-10-22 RX ORDER — LOSARTAN POTASSIUM AND HYDROCHLOROTHIAZIDE 12.5; 5 MG/1; MG/1
1 TABLET ORAL DAILY
Qty: 90 TABLET | Refills: 1 | Status: SHIPPED | OUTPATIENT
Start: 2019-10-22 | End: 2020-06-30 | Stop reason: RX

## 2019-12-23 ENCOUNTER — TELEPHONE (OUTPATIENT)
Dept: PRIMARY CARE CLINIC | Age: 64
End: 2019-12-23

## 2019-12-23 DIAGNOSIS — K21.9 GASTROESOPHAGEAL REFLUX DISEASE WITHOUT ESOPHAGITIS: ICD-10-CM

## 2019-12-23 RX ORDER — PANTOPRAZOLE SODIUM 40 MG/1
TABLET, DELAYED RELEASE ORAL
Qty: 90 TABLET | Refills: 1 | Status: SHIPPED | OUTPATIENT
Start: 2019-12-23 | End: 2020-06-26 | Stop reason: SDUPTHER

## 2020-01-22 RX ORDER — HYDROCHLOROTHIAZIDE 12.5 MG/1
12.5 CAPSULE, GELATIN COATED ORAL EVERY MORNING
Qty: 90 CAPSULE | Refills: 1 | Status: SHIPPED | OUTPATIENT
Start: 2020-01-22 | End: 2020-06-30 | Stop reason: SDUPTHER

## 2020-01-22 RX ORDER — LOSARTAN POTASSIUM 50 MG/1
50 TABLET ORAL DAILY
Qty: 90 TABLET | Refills: 1 | Status: SHIPPED | OUTPATIENT
Start: 2020-01-22 | End: 2020-06-30 | Stop reason: SDUPTHER

## 2020-01-22 NOTE — TELEPHONE ENCOUNTER
Pharmacy called the combo is unavailable at this time. Medication pending as two separate medications        Health Maintenance   Topic Date Due    Hepatitis C screen  1955    DTaP/Tdap/Td vaccine (1 - Tdap) 05/12/1968    HIV screen  05/12/1972    Shingles Vaccine (1 of 2) 05/12/2007    Low dose CT lung screening  07/14/2016    Potassium monitoring  06/19/2019    Creatinine monitoring  06/19/2019    Flu vaccine (1) 09/01/2019    Colon cancer screen colonoscopy  09/10/2019    Pneumococcal 0-64 years Vaccine (1 of 1 - PPSV23) 03/28/2020 (Originally 5/12/1963)    Lipid screen  06/19/2023             (applicable per patient's age: Cancer Screenings, Depression Screening, Fall Risk Screening, Immunizations)    LDL Cholesterol (mg/dL)   Date Value   06/19/2018 68     AST (U/L)   Date Value   07/19/2018 101 (H)     ALT (U/L)   Date Value   07/19/2018 113 (H)     BUN (mg/dL)   Date Value   06/19/2018 12      (goal A1C is < 7)   (goal LDL is <100) need 30-50% reduction from baseline     BP Readings from Last 3 Encounters:   05/17/19 (!) 142/82   04/26/19 (!) 160/96   03/28/19 (!) 154/100    (goal /80)      All Future Testing planned in [facility]:      Next Visit Date:  No future appointments.          Patient Active Problem List:     Guaiac positive stools     Family history of colon cancer     Gastroesophageal reflux disease without esophagitis     History of chronic gastritis     Rectal bleeding

## 2020-06-26 RX ORDER — PANTOPRAZOLE SODIUM 40 MG/1
TABLET, DELAYED RELEASE ORAL
Qty: 30 TABLET | Refills: 0 | Status: SHIPPED | OUTPATIENT
Start: 2020-06-26 | End: 2020-06-30 | Stop reason: SDUPTHER

## 2020-06-30 ENCOUNTER — OFFICE VISIT (OUTPATIENT)
Dept: FAMILY MEDICINE CLINIC | Age: 65
End: 2020-06-30
Payer: COMMERCIAL

## 2020-06-30 VITALS
OXYGEN SATURATION: 98 % | DIASTOLIC BLOOD PRESSURE: 86 MMHG | SYSTOLIC BLOOD PRESSURE: 128 MMHG | BODY MASS INDEX: 27.02 KG/M2 | HEART RATE: 84 BPM | TEMPERATURE: 97.8 F | WEIGHT: 183 LBS

## 2020-06-30 PROCEDURE — G8427 DOCREV CUR MEDS BY ELIG CLIN: HCPCS | Performed by: NURSE PRACTITIONER

## 2020-06-30 PROCEDURE — 4004F PT TOBACCO SCREEN RCVD TLK: CPT | Performed by: NURSE PRACTITIONER

## 2020-06-30 PROCEDURE — 99214 OFFICE O/P EST MOD 30 MIN: CPT | Performed by: NURSE PRACTITIONER

## 2020-06-30 PROCEDURE — 3017F COLORECTAL CA SCREEN DOC REV: CPT | Performed by: NURSE PRACTITIONER

## 2020-06-30 PROCEDURE — G8419 CALC BMI OUT NRM PARAM NOF/U: HCPCS | Performed by: NURSE PRACTITIONER

## 2020-06-30 RX ORDER — PANTOPRAZOLE SODIUM 40 MG/1
TABLET, DELAYED RELEASE ORAL
Qty: 90 TABLET | Refills: 1 | Status: SHIPPED | OUTPATIENT
Start: 2020-06-30 | End: 2021-01-14

## 2020-06-30 RX ORDER — HYDROCHLOROTHIAZIDE 12.5 MG/1
12.5 CAPSULE, GELATIN COATED ORAL EVERY MORNING
Qty: 90 CAPSULE | Refills: 1 | Status: SHIPPED | OUTPATIENT
Start: 2020-06-30 | End: 2021-01-11

## 2020-06-30 RX ORDER — LOSARTAN POTASSIUM 50 MG/1
50 TABLET ORAL DAILY
Qty: 90 TABLET | Refills: 1 | Status: SHIPPED | OUTPATIENT
Start: 2020-06-30 | End: 2021-01-11

## 2020-06-30 SDOH — ECONOMIC STABILITY: TRANSPORTATION INSECURITY
IN THE PAST 12 MONTHS, HAS THE LACK OF TRANSPORTATION KEPT YOU FROM MEDICAL APPOINTMENTS OR FROM GETTING MEDICATIONS?: NO

## 2020-06-30 SDOH — ECONOMIC STABILITY: TRANSPORTATION INSECURITY
IN THE PAST 12 MONTHS, HAS LACK OF TRANSPORTATION KEPT YOU FROM MEETINGS, WORK, OR FROM GETTING THINGS NEEDED FOR DAILY LIVING?: NO

## 2020-06-30 SDOH — ECONOMIC STABILITY: INCOME INSECURITY: HOW HARD IS IT FOR YOU TO PAY FOR THE VERY BASICS LIKE FOOD, HOUSING, MEDICAL CARE, AND HEATING?: NOT HARD AT ALL

## 2020-06-30 SDOH — ECONOMIC STABILITY: FOOD INSECURITY: WITHIN THE PAST 12 MONTHS, YOU WORRIED THAT YOUR FOOD WOULD RUN OUT BEFORE YOU GOT MONEY TO BUY MORE.: NEVER TRUE

## 2020-06-30 SDOH — ECONOMIC STABILITY: FOOD INSECURITY: WITHIN THE PAST 12 MONTHS, THE FOOD YOU BOUGHT JUST DIDN'T LAST AND YOU DIDN'T HAVE MONEY TO GET MORE.: NEVER TRUE

## 2020-06-30 ASSESSMENT — PATIENT HEALTH QUESTIONNAIRE - PHQ9
2. FEELING DOWN, DEPRESSED OR HOPELESS: 0
SUM OF ALL RESPONSES TO PHQ9 QUESTIONS 1 & 2: 0
SUM OF ALL RESPONSES TO PHQ QUESTIONS 1-9: 0
1. LITTLE INTEREST OR PLEASURE IN DOING THINGS: 0
SUM OF ALL RESPONSES TO PHQ QUESTIONS 1-9: 0

## 2020-06-30 ASSESSMENT — ENCOUNTER SYMPTOMS
DIARRHEA: 0
COUGH: 0
VOMITING: 0
HEARTBURN: 0
NAUSEA: 0
SHORTNESS OF BREATH: 0

## 2020-06-30 NOTE — PATIENT INSTRUCTIONS
SURVEY:    You may be receiving a survey from [employer] regarding your visit today. Please complete the survey to enable us to provide the highest quality of care to you and your family. If you cannot score us a very good (5 Stars) on any question, please call the office to discuss how we could have made your experience a very good one. Thank you.     Clinical Care Team: Alessandro Guerrero, JOHNATHAN-EVELYN Turner LPN    Clerical Team: Larry Contreras

## 2020-06-30 NOTE — PROGRESS NOTES
HPI Notes    Name: Toño Bauer  : 1955         Chief Complaint:     Chief Complaint   Patient presents with    Gastroesophageal Reflux     Patient here today for check up    Hypertension       History of Present Illness:        Gastroesophageal Reflux   He reports no chest pain, no coughing, no heartburn or no nausea. This is a chronic problem. The current episode started more than 1 year ago. The problem occurs rarely (if on PPI). The problem has been gradually improving. Nothing aggravates the symptoms. Risk factors include caffeine use, lack of exercise, ETOH use and smoking/tobacco exposure. He has tried a PPI for the symptoms. The treatment provided moderate relief. Hypertension   This is a chronic problem. The current episode started more than 1 year ago. The problem is controlled. Pertinent negatives include no chest pain, headaches, palpitations, peripheral edema or shortness of breath. Risk factors for coronary artery disease include male gender, sedentary lifestyle and smoking/tobacco exposure. Past treatments include angiotensin blockers and diuretics. The current treatment provides moderate improvement. Compliance problems include diet and exercise.         Past Medical History:     Past Medical History:   Diagnosis Date    Back pain     Head injury     head injury from a fall in 5th grade    History of colon polyps 2016    Skull fracture [facility])       Reviewed all health maintenance requirements and ordered appropriate tests  Health Maintenance Due   Topic Date Due    Hepatitis C screen  1955    Pneumococcal 0-64 years Vaccine (1 of 1 - PPSV23) 1963    HIV screen  1972    DTaP/Tdap/Td vaccine (1 - Tdap) 1976    Shingles Vaccine (1 of 2) 2007    Low dose CT lung screening  2016    Potassium monitoring  2019    Creatinine monitoring  2019    Colon cancer screen colonoscopy  09/10/2019       Past Surgical History:     Past Surgical History:   Procedure Laterality Date    COLONOSCOPY  11/15/2016    Manjula Garcia MD    COLONOSCOPY N/A 9/10/2018    COLONOSCOPY POLYPECTOMY HOT BIOPSY performed by Aminta Gutierrez MD at [address] [place] [address]      as child   [facility] ENDOSCOPY  11/15/2016    Manjula Garcia MD    UPPER GASTROINTESTINAL ENDOSCOPY N/A 9/10/2018    EGD BIOPSY performed by Aminta Gutierrez MD at [facility] OR        Medications:       Prior to Admission medications    Medication Sig Start Date End Date Taking? Authorizing Provider   pantoprazole (PROTONIX) 40 MG tablet 1 tablet by mouth daily 6/30/20  Yes Constantine FlatterJOHNATHAN - CNP   losartan (COZAAR) 50 MG tablet Take 1 tablet by mouth daily 6/30/20  Yes Constantine Flatter, APRN - EVELYN   hydroCHLOROthiazide (MICROZIDE) 12.5 MG capsule Take 1 capsule by mouth every morning 6/30/20  Yes Constantine Flatter, APRN - CNP   naproxen (NAPROXEN) 500 MG EC tablet Take 1 tablet by mouth 2 times daily (with meals) 6/19/18  Yes JOHNATHAN Bejarano - CNP        Allergies:       Penicillins    Social History:     Tobacco:    reports that he has been smoking cigarettes. He started smoking about 46 years ago. He has a 50.00 pack-year smoking history. He has quit using smokeless tobacco.  Alcohol:      reports current alcohol use of about 5.0 standard drinks of alcohol per week. Drug Use:  reports no history of drug use. Family History:        Family History   Problem Relation Age of Onset    Cancer Paternal Grandmother         colon cancer       Review of Systems:         Review of Systems   Constitutional: Negative for chills and fever. Respiratory: Negative for cough and shortness of breath. Cardiovascular: Negative for chest pain and palpitations. Gastrointestinal: Negative for diarrhea, heartburn, nausea and vomiting. Neurological: Negative for dizziness, seizures and headaches.          Physical Exam:     Vitals:  /86   Pulse 84   Temp 97.8 °F (36.6 °C) NICO Ram LPN    Clerical Team: Anibal Merino        Electronically signed by JOHNATHAN Reyez CNP on 2020 at 2:04 PM           Completed Refills      Requested Prescriptions     Signed Prescriptions Disp Refills    pantoprazole (PROTONIX) 40 MG tablet 90 tablet 1     Si tablet by mouth daily    losartan (COZAAR) 50 MG tablet 90 tablet 1     Sig: Take 1 tablet by mouth daily    hydroCHLOROthiazide (MICROZIDE) 12.5 MG capsule 90 capsule 1     Sig: Take 1 capsule by mouth every morning         Yves received counseling on the following healthy behaviors: nutrition, exercise and medication adherence, alcohol consumption, nicotine abuse  Reviewed prior labs and health maintenance. Continue current medications, diet and exercise. Discussed use, benefit, and side effects of prescribed medications. Barriers to medication compliance addressed. Patient given educational materials - see patient instructions. All patient questions answered. Patient voiced understanding.

## 2020-08-24 ENCOUNTER — TELEPHONE (OUTPATIENT)
Dept: FAMILY MEDICINE CLINIC | Age: 65
End: 2020-08-24

## 2021-01-14 DIAGNOSIS — K21.9 GASTROESOPHAGEAL REFLUX DISEASE WITHOUT ESOPHAGITIS: ICD-10-CM

## 2021-01-14 RX ORDER — PANTOPRAZOLE SODIUM 40 MG/1
TABLET, DELAYED RELEASE ORAL
Qty: 90 TABLET | Refills: 1 | Status: SHIPPED | OUTPATIENT
Start: 2021-01-14 | End: 2021-04-15 | Stop reason: SDUPTHER

## 2021-01-14 NOTE — TELEPHONE ENCOUNTER
Last OV: 6/30/2020 for check up     Next scheduled apt: Visit date not found        Sure scripts request      Rx pending.

## 2021-04-15 ENCOUNTER — OFFICE VISIT (OUTPATIENT)
Dept: FAMILY MEDICINE CLINIC | Age: 66
End: 2021-04-15
Payer: COMMERCIAL

## 2021-04-15 ENCOUNTER — HOSPITAL ENCOUNTER (OUTPATIENT)
Age: 66
Discharge: HOME | End: 2021-04-15
Payer: COMMERCIAL

## 2021-04-15 VITALS
SYSTOLIC BLOOD PRESSURE: 130 MMHG | DIASTOLIC BLOOD PRESSURE: 82 MMHG | HEART RATE: 84 BPM | WEIGHT: 186 LBS | OXYGEN SATURATION: 98 % | BODY MASS INDEX: 27.55 KG/M2 | HEIGHT: 69 IN | TEMPERATURE: 98.1 F

## 2021-04-15 DIAGNOSIS — M16.11 PRIMARY OSTEOARTHRITIS OF RIGHT HIP: ICD-10-CM

## 2021-04-15 DIAGNOSIS — Z13.1 ENCOUNTER FOR SCREENING EXAMINATION FOR IMPAIRED GLUCOSE REGULATION AND DIABETES MELLITUS: ICD-10-CM

## 2021-04-15 DIAGNOSIS — Z13.220 SCREENING FOR HYPERLIPIDEMIA: ICD-10-CM

## 2021-04-15 DIAGNOSIS — I10 ESSENTIAL HYPERTENSION: Primary | ICD-10-CM

## 2021-04-15 DIAGNOSIS — K21.9 GASTROESOPHAGEAL REFLUX DISEASE WITHOUT ESOPHAGITIS: ICD-10-CM

## 2021-04-15 DIAGNOSIS — I10 ESSENTIAL HYPERTENSION: ICD-10-CM

## 2021-04-15 LAB
ABSOLUTE EOS #: 0.1 K/UL (ref 0–0.4)
ABSOLUTE IMMATURE GRANULOCYTE: ABNORMAL K/UL (ref 0–0.3)
ABSOLUTE LYMPH #: 1.8 K/UL (ref 1–4.8)
ABSOLUTE MONO #: 0.6 K/UL (ref 0–1)
ALBUMIN SERPL-MCNC: 3.9 G/DL (ref 3.5–5.2)
ALBUMIN/GLOBULIN RATIO: ABNORMAL (ref 1–2.5)
ALP BLD-CCNC: 107 U/L (ref 40–129)
ALT SERPL-CCNC: 97 U/L (ref 5–41)
ANION GAP SERPL CALCULATED.3IONS-SCNC: 8 MMOL/L (ref 9–17)
AST SERPL-CCNC: 167 U/L
BASOPHILS # BLD: 0 % (ref 0–2)
BASOPHILS ABSOLUTE: 0 K/UL (ref 0–0.2)
BILIRUB SERPL-MCNC: 1.43 MG/DL (ref 0.3–1.2)
BUN BLDV-MCNC: 9 MG/DL (ref 8–23)
BUN/CREAT BLD: 13 (ref 9–20)
CALCIUM SERPL-MCNC: 9.2 MG/DL (ref 8.6–10.4)
CHLORIDE BLD-SCNC: 96 MMOL/L (ref 98–107)
CHOLESTEROL/HDL RATIO: 2.5
CHOLESTEROL: 116 MG/DL
CO2: 25 MMOL/L (ref 20–31)
CREAT SERPL-MCNC: 0.71 MG/DL (ref 0.7–1.2)
DIFFERENTIAL TYPE: YES
EOSINOPHILS RELATIVE PERCENT: 2 % (ref 0–5)
GFR AFRICAN AMERICAN: >60 ML/MIN
GFR NON-AFRICAN AMERICAN: >60 ML/MIN
GFR SERPL CREATININE-BSD FRML MDRD: ABNORMAL ML/MIN/{1.73_M2}
GFR SERPL CREATININE-BSD FRML MDRD: ABNORMAL ML/MIN/{1.73_M2}
GLUCOSE BLD-MCNC: 95 MG/DL (ref 70–99)
HCT VFR BLD CALC: 43.6 % (ref 41–53)
HDLC SERPL-MCNC: 46 MG/DL
HEMOGLOBIN: 15.2 G/DL (ref 13.5–17.5)
IMMATURE GRANULOCYTES: ABNORMAL %
LDL CHOLESTEROL: 57 MG/DL (ref 0–130)
LYMPHOCYTES # BLD: 34 % (ref 13–44)
MCH RBC QN AUTO: 31.7 PG (ref 26–34)
MCHC RBC AUTO-ENTMCNC: 34.8 G/DL (ref 31–37)
MCV RBC AUTO: 91.1 FL (ref 80–100)
MONOCYTES # BLD: 11 % (ref 5–9)
NRBC AUTOMATED: ABNORMAL PER 100 WBC
PATIENT FASTING?: NORMAL
PDW BLD-RTO: 13.3 % (ref 12.1–15.2)
PLATELET # BLD: 121 K/UL (ref 140–450)
PLATELET ESTIMATE: ABNORMAL
PMV BLD AUTO: ABNORMAL FL
POTASSIUM SERPL-SCNC: 4.2 MMOL/L (ref 3.7–5.3)
RBC # BLD: 4.79 M/UL (ref 4.5–5.9)
RBC # BLD: ABNORMAL 10*6/UL
SEG NEUTROPHILS: 53 % (ref 39–75)
SEGMENTED NEUTROPHILS ABSOLUTE COUNT: 2.9 K/UL (ref 2.1–6.5)
SODIUM BLD-SCNC: 129 MMOL/L (ref 135–144)
TOTAL PROTEIN: 8 G/DL (ref 6.4–8.3)
TRIGL SERPL-MCNC: 66 MG/DL
VLDLC SERPL CALC-MCNC: NORMAL MG/DL (ref 1–30)
WBC # BLD: 5.5 K/UL (ref 3.5–11)
WBC # BLD: ABNORMAL 10*3/UL

## 2021-04-15 PROCEDURE — 99213 OFFICE O/P EST LOW 20 MIN: CPT | Performed by: NURSE PRACTITIONER

## 2021-04-15 PROCEDURE — 80053 COMPREHEN METABOLIC PANEL: CPT

## 2021-04-15 PROCEDURE — 85025 COMPLETE CBC W/AUTO DIFF WBC: CPT

## 2021-04-15 PROCEDURE — 3017F COLORECTAL CA SCREEN DOC REV: CPT | Performed by: NURSE PRACTITIONER

## 2021-04-15 PROCEDURE — G8419 CALC BMI OUT NRM PARAM NOF/U: HCPCS | Performed by: NURSE PRACTITIONER

## 2021-04-15 PROCEDURE — 36415 COLL VENOUS BLD VENIPUNCTURE: CPT

## 2021-04-15 PROCEDURE — G8427 DOCREV CUR MEDS BY ELIG CLIN: HCPCS | Performed by: NURSE PRACTITIONER

## 2021-04-15 PROCEDURE — 80061 LIPID PANEL: CPT

## 2021-04-15 PROCEDURE — 4004F PT TOBACCO SCREEN RCVD TLK: CPT | Performed by: NURSE PRACTITIONER

## 2021-04-15 RX ORDER — NAPROXEN 500 MG/1
500 TABLET ORAL 2 TIMES DAILY WITH MEALS
Qty: 180 TABLET | Refills: 1 | Status: SHIPPED | OUTPATIENT
Start: 2021-04-15

## 2021-04-15 RX ORDER — HYDROCHLOROTHIAZIDE 12.5 MG/1
12.5 CAPSULE, GELATIN COATED ORAL EVERY MORNING
Qty: 90 CAPSULE | Refills: 1 | Status: CANCELLED | OUTPATIENT
Start: 2021-04-15

## 2021-04-15 RX ORDER — PANTOPRAZOLE SODIUM 40 MG/1
40 TABLET, DELAYED RELEASE ORAL DAILY
Qty: 90 TABLET | Refills: 1 | Status: SHIPPED | OUTPATIENT
Start: 2021-04-15 | End: 2021-10-21 | Stop reason: SDUPTHER

## 2021-04-15 RX ORDER — LOSARTAN POTASSIUM 50 MG/1
50 TABLET ORAL DAILY
Qty: 90 TABLET | Refills: 1 | Status: CANCELLED | OUTPATIENT
Start: 2021-04-15

## 2021-04-15 RX ORDER — LOSARTAN POTASSIUM AND HYDROCHLOROTHIAZIDE 12.5; 5 MG/1; MG/1
1 TABLET ORAL DAILY
Qty: 90 TABLET | Refills: 1 | Status: SHIPPED | OUTPATIENT
Start: 2021-04-15 | End: 2021-10-21 | Stop reason: SDUPTHER

## 2021-04-15 ASSESSMENT — ENCOUNTER SYMPTOMS
DIARRHEA: 0
HEARTBURN: 0
NAUSEA: 0
SHORTNESS OF BREATH: 0
VOMITING: 0
COUGH: 0

## 2021-04-15 ASSESSMENT — PATIENT HEALTH QUESTIONNAIRE - PHQ9
2. FEELING DOWN, DEPRESSED OR HOPELESS: 0
SUM OF ALL RESPONSES TO PHQ QUESTIONS 1-9: 0

## 2021-04-15 NOTE — PROGRESS NOTES
seizures and headaches. Physical Exam:     Vitals:  /82 (Site: Right Upper Arm, Position: Sitting, Cuff Size: Medium Adult)   Pulse 84   Temp 98.1 °F (36.7 °C) (Oral)   Ht 5' 9\" (1.753 m)   Wt 186 lb (84.4 kg)   SpO2 98%   BMI 27.47 kg/m²       Physical Exam  Vitals signs and nursing note reviewed. Constitutional:       Appearance: He is well-developed. Cardiovascular:      Rate and Rhythm: Normal rate and regular rhythm. Heart sounds: S1 normal and S2 normal.   Pulmonary:      Effort: Pulmonary effort is normal. No respiratory distress. Breath sounds: Normal breath sounds. Abdominal:      General: Bowel sounds are normal.      Palpations: Abdomen is soft. Tenderness: There is no abdominal tenderness. Skin:     General: Skin is warm and dry. Psychiatric:         Behavior: Behavior is cooperative. Data:     Lab Results   Component Value Date     06/19/2018    K 4.7 06/19/2018    CL 97 06/19/2018    CO2 24 06/19/2018    BUN 12 06/19/2018    CREATININE 0.82 06/19/2018    GLUCOSE 87 06/19/2018    PROT 7.9 07/19/2018    LABALBU 4.1 07/19/2018    BILITOT 0.60 07/19/2018    ALKPHOS 65 07/19/2018     07/19/2018     07/19/2018     Lab Results   Component Value Date    WBC 7.2 06/19/2018    RBC 5.58 06/19/2018    HGB 16.6 06/19/2018    HCT 49.4 06/19/2018    MCV 88.5 06/19/2018    MCH 29.8 06/19/2018    MCHC 33.7 06/19/2018    RDW 13.4 06/19/2018     06/19/2018    MPV NOT REPORTED 06/19/2018     No results found for: TSH  Lab Results   Component Value Date    CHOL 147 06/19/2018    HDL 65 06/19/2018          Assessment & Plan        Diagnosis Orders   1. Essential hypertension  --BP controlled at this time. Pt states that losartan and HCTZ did not seem to work as well when split, thinks combo pill worked better. Hyzaar is available now, will combine medications. CBC Auto Differential    Comprehensive Metabolic Panel    Lipid Panel   2. Gastroesophageal reflux disease without esophagitis  --well controlled with PPI. Continue diet modification. pantoprazole (PROTONIX) 40 MG tablet    CBC Auto Differential    Comprehensive Metabolic Panel   3. Primary osteoarthritis of right hip   --controlled with naproxen use. Pt advised to take medication with food. naproxen (NAPROXEN) 500 MG EC tablet    CBC Auto Differential    Comprehensive Metabolic Panel   4. Screening for hyperlipidemia  Lipid Panel   5. Encounter for screening examination for impaired glucose regulation and diabetes mellitus  Comprehensive Metabolic Panel     Patient verbalizes understanding and agreement with plan. All questions answered. If symptoms do not resolve or worsen, return to office. Completed Refills   Requested Prescriptions     Signed Prescriptions Disp Refills    pantoprazole (PROTONIX) 40 MG tablet 90 tablet 1     Sig: Take 1 tablet by mouth daily take 1 tablet by mouth once daily    naproxen (NAPROXEN) 500 MG EC tablet 180 tablet 1     Sig: Take 1 tablet by mouth 2 times daily (with meals)    losartan-hydroCHLOROthiazide (HYZAAR) 50-12.5 MG per tablet 90 tablet 1     Sig: Take 1 tablet by mouth daily     No follow-ups on file.      Orders Placed This Encounter   Medications    pantoprazole (PROTONIX) 40 MG tablet     Sig: Take 1 tablet by mouth daily take 1 tablet by mouth once daily     Dispense:  90 tablet     Refill:  1    naproxen (NAPROXEN) 500 MG EC tablet     Sig: Take 1 tablet by mouth 2 times daily (with meals)     Dispense:  180 tablet     Refill:  1    losartan-hydroCHLOROthiazide (HYZAAR) 50-12.5 MG per tablet     Sig: Take 1 tablet by mouth daily     Dispense:  90 tablet     Refill:  1     Orders Placed This Encounter   Procedures    CBC Auto Differential     Standing Status:   Future     Number of Occurrences:   1     Standing Expiration Date:   5/20/2022    Comprehensive Metabolic Panel     Standing Status:   Future     Number of Occurrences:   1     Standing Expiration Date:   5/20/2022    Lipid Panel     Standing Status:   Future     Number of Occurrences:   1     Standing Expiration Date:   5/20/2022     Order Specific Question:   Is Patient Fasting?/# of Hours     Answer:   12         There are no Patient Instructions on file for this visit. Electronically signed by JOHNATHAN Jacobsen CNP on 4/15/2021 at 2:41 PM           Completed Refills      Requested Prescriptions     Signed Prescriptions Disp Refills    pantoprazole (PROTONIX) 40 MG tablet 90 tablet 1     Sig: Take 1 tablet by mouth daily take 1 tablet by mouth once daily    naproxen (NAPROXEN) 500 MG EC tablet 180 tablet 1     Sig: Take 1 tablet by mouth 2 times daily (with meals)    losartan-hydroCHLOROthiazide (HYZAAR) 50-12.5 MG per tablet 90 tablet 1     Sig: Take 1 tablet by mouth daily         Yves received counseling on the following healthy behaviors: nutrition, exercise and medication adherence  Reviewed prior labs and health maintenance. Continue current medications, diet and exercise. Discussed use, benefit, and side effects of prescribed medications. Barriers to medication compliance addressed. Patient given educational materials - see patient instructions. All patient questions answered. Patient voiced understanding.

## 2021-04-16 ENCOUNTER — TELEPHONE (OUTPATIENT)
Dept: FAMILY MEDICINE CLINIC | Age: 66
End: 2021-04-16

## 2021-04-16 NOTE — TELEPHONE ENCOUNTER
Prior auth needed for naproxen EC 500mg  [id number] ( Reference Number)  [id number]  Will notify by fax for decision.

## 2021-10-20 ENCOUNTER — TELEPHONE (OUTPATIENT)
Dept: FAMILY MEDICINE CLINIC | Age: 66
End: 2021-10-20

## 2021-10-20 NOTE — TELEPHONE ENCOUNTER
----- Message from Erika Marlon sent at 10/20/2021 11:23 AM EDT -----  Subject: Refill Request    QUESTIONS  Name of Medication? losartan-hydroCHLOROthiazide (HYZAAR) 50-12.5 MG per   tablet  Patient-reported dosage and instructions? Take 1 tablet by mouth daily -   Oral  How many days do you have left? 1  Preferred Pharmacy? [address] phone number (if available)? [phone]  ---------------------------------------------------------------------------  --------------  CALL BACK INFO  What is the best way for the office to contact you? OK to leave message on   voicemail  Preferred Call Back Phone Number?  [phone]

## 2021-10-21 ENCOUNTER — OFFICE VISIT (OUTPATIENT)
Dept: FAMILY MEDICINE CLINIC | Age: 66
End: 2021-10-21
Payer: COMMERCIAL

## 2021-10-21 VITALS
HEART RATE: 76 BPM | OXYGEN SATURATION: 98 % | BODY MASS INDEX: 26.29 KG/M2 | WEIGHT: 178 LBS | DIASTOLIC BLOOD PRESSURE: 82 MMHG | TEMPERATURE: 98.2 F | SYSTOLIC BLOOD PRESSURE: 128 MMHG

## 2021-10-21 DIAGNOSIS — I10 PRIMARY HYPERTENSION: Primary | ICD-10-CM

## 2021-10-21 DIAGNOSIS — K21.9 GASTROESOPHAGEAL REFLUX DISEASE WITHOUT ESOPHAGITIS: ICD-10-CM

## 2021-10-21 DIAGNOSIS — Z12.11 SCREENING FOR MALIGNANT NEOPLASM OF COLON: ICD-10-CM

## 2021-10-21 DIAGNOSIS — L98.9 SKIN LESION: ICD-10-CM

## 2021-10-21 DIAGNOSIS — C44.529 SQUAMOUS CELL CARCINOMA OF BACK: ICD-10-CM

## 2021-10-21 PROCEDURE — 99214 OFFICE O/P EST MOD 30 MIN: CPT | Performed by: NURSE PRACTITIONER

## 2021-10-21 PROCEDURE — 4004F PT TOBACCO SCREEN RCVD TLK: CPT | Performed by: NURSE PRACTITIONER

## 2021-10-21 PROCEDURE — G8427 DOCREV CUR MEDS BY ELIG CLIN: HCPCS | Performed by: NURSE PRACTITIONER

## 2021-10-21 PROCEDURE — G8419 CALC BMI OUT NRM PARAM NOF/U: HCPCS | Performed by: NURSE PRACTITIONER

## 2021-10-21 PROCEDURE — 3017F COLORECTAL CA SCREEN DOC REV: CPT | Performed by: NURSE PRACTITIONER

## 2021-10-21 PROCEDURE — G8484 FLU IMMUNIZE NO ADMIN: HCPCS | Performed by: NURSE PRACTITIONER

## 2021-10-21 RX ORDER — PANTOPRAZOLE SODIUM 40 MG/1
40 TABLET, DELAYED RELEASE ORAL DAILY
Qty: 90 TABLET | Refills: 1 | Status: SHIPPED | OUTPATIENT
Start: 2021-10-21 | End: 2022-04-19 | Stop reason: SDUPTHER

## 2021-10-21 RX ORDER — LOSARTAN POTASSIUM AND HYDROCHLOROTHIAZIDE 12.5; 5 MG/1; MG/1
1 TABLET ORAL DAILY
Qty: 90 TABLET | Refills: 1 | Status: SHIPPED | OUTPATIENT
Start: 2021-10-21 | End: 2022-04-19 | Stop reason: SDUPTHER

## 2021-10-21 SDOH — ECONOMIC STABILITY: FOOD INSECURITY: WITHIN THE PAST 12 MONTHS, THE FOOD YOU BOUGHT JUST DIDN'T LAST AND YOU DIDN'T HAVE MONEY TO GET MORE.: NEVER TRUE

## 2021-10-21 SDOH — ECONOMIC STABILITY: FOOD INSECURITY: WITHIN THE PAST 12 MONTHS, YOU WORRIED THAT YOUR FOOD WOULD RUN OUT BEFORE YOU GOT MONEY TO BUY MORE.: NEVER TRUE

## 2021-10-21 ASSESSMENT — SOCIAL DETERMINANTS OF HEALTH (SDOH): HOW HARD IS IT FOR YOU TO PAY FOR THE VERY BASICS LIKE FOOD, HOUSING, MEDICAL CARE, AND HEATING?: NOT HARD AT ALL

## 2021-10-21 ASSESSMENT — ENCOUNTER SYMPTOMS: HEARTBURN: 0

## 2021-10-21 NOTE — PROGRESS NOTES
HPI Notes    Name: El Marley  : 1955         Chief Complaint:     Chief Complaint   Patient presents with    Hypertension     Patient here today for check up    Gastroesophageal Reflux    Lesion(s)     Patient would like areas checked on back. History of Present Illness:        Hypertension  This is a chronic problem. The current episode started more than 1 year ago. The problem is controlled. Risk factors for coronary artery disease include male gender and smoking/tobacco exposure. Past treatments include diuretics and angiotensin blockers. The current treatment provides moderate improvement. There are no compliance problems. Gastroesophageal Reflux  He reports no heartburn. This is a chronic problem. The current episode started more than 1 year ago. Nothing aggravates the symptoms. Risk factors include caffeine use, lack of exercise and smoking/tobacco exposure. He has tried a PPI for the symptoms. The treatment provided moderate relief. Pt has a skin lesion to his right back. Has been present for about 1 year. Pt scratched it once and it bled.      Past Medical History:     Past Medical History:   Diagnosis Date    Back pain     Head injury     head injury from a fall in 5th grade    History of colon polyps 2016    Skull fracture [facility])       Reviewed all health maintenance requirements and ordered appropriate tests  Health Maintenance Due   Topic Date Due    Pneumococcal 0-64 years Vaccine (1 of 2 - PPSV23) Never done    DTaP/Tdap/Td vaccine (1 - Tdap) Never done    Shingles Vaccine (1 of 2) Never done    Flu vaccine (1) Never done    Colon cancer screen colonoscopy  09/10/2021    COVID-19 Vaccine (3 - Pfizer booster) 10/07/2021       Past Surgical History:     Past Surgical History:   Procedure Laterality Date    COLONOSCOPY  11/15/2016    Beltran Hewitt MD    COLONOSCOPY N/A 9/10/2018    COLONOSCOPY POLYPECTOMY HOT BIOPSY performed by Alia England MD at [address] [facility] FRACTURE ELEVATION      as child    UPPER GASTROINTESTINAL ENDOSCOPY  11/15/2016    Joan Rodríguez MD    UPPER GASTROINTESTINAL ENDOSCOPY N/A 9/10/2018    EGD BIOPSY performed by Deepthi Herman MD at [facility] OR        Medications:       Prior to Admission medications    Medication Sig Start Date End Date Taking? Authorizing Provider   losartan-hydroCHLOROthiazide (HYZAAR) 50-12.5 MG per tablet Take 1 tablet by mouth daily 10/21/21  Yes JOHNATHAN Cheng - CNP   pantoprazole (PROTONIX) 40 MG tablet Take 1 tablet by mouth daily take 1 tablet by mouth once daily 10/21/21  Yes Rama Garcia APRN - CNP   naproxen (NAPROXEN) 500 MG EC tablet Take 1 tablet by mouth 2 times daily (with meals)  Patient not taking: Reported on 10/21/2021 4/15/21   JOHNATHAN Cheng CNP        Allergies:       Penicillins    Social History:     Tobacco:    reports that he has been smoking cigarettes. He started smoking about 47 years ago. He has a 50.00 pack-year smoking history. He has quit using smokeless tobacco.  Alcohol:      reports current alcohol use of about 5.0 standard drinks of alcohol per week. Drug Use:  reports no history of drug use. Family History:        Family History   Problem Relation Age of Onset    Cancer Paternal Grandmother         colon cancer       Review of Systems:         Review of Systems   Gastrointestinal: Negative for heartburn.          Physical Exam:     Vitals:  /82   Pulse 76   Temp 98.2 °F (36.8 °C) (Oral)   Wt 178 lb (80.7 kg)   SpO2 98%   BMI 26.29 kg/m²       Physical Exam              Data:     Lab Results   Component Value Date     04/15/2021    K 4.2 04/15/2021    CL 96 04/15/2021    CO2 25 04/15/2021    BUN 9 04/15/2021    CREATININE 0.71 04/15/2021    GLUCOSE 95 04/15/2021    PROT 8.0 04/15/2021    LABALBU 3.9 04/15/2021    BILITOT 1.43 04/15/2021    ALKPHOS 107 04/15/2021     04/15/2021    ALT 97 04/15/2021     Lab Results   Component Value Date    WBC 5.5 04/15/2021    RBC 4.79 04/15/2021    HGB 15.2 04/15/2021    HCT 43.6 04/15/2021    MCV 91.1 04/15/2021    MCH 31.7 04/15/2021    MCHC 34.8 04/15/2021    RDW 13.3 04/15/2021     04/15/2021    MPV NOT REPORTED 04/15/2021     No results found for: TSH  Lab Results   Component Value Date    CHOL 116 04/15/2021    HDL 46 04/15/2021          Assessment & Plan        Diagnosis Orders   1. Primary hypertension  --BP well controlled. Continue Hyzaar. 2. Gastroesophageal reflux disease without esophagitis  --well controlled on PPI. Continue medication and PPI. pantoprazole (PROTONIX) 40 MG tablet   3. Skin lesion  --suspicious lesion for SCC. Will send to Derm in Advance. External Referral To Dermatology   4. Squamous cell carcinoma of back  External Referral To Dermatology   5. Screening for malignant neoplasm of colon           ----referral made for Dr Gian Kramer. Pt has hx of polyps. Patient verbalizes understanding and agreement with plan. All questions answered. If symptoms do not resolve or worsen, return to office. Completed Refills   Requested Prescriptions     Signed Prescriptions Disp Refills    losartan-hydroCHLOROthiazide (HYZAAR) 50-12.5 MG per tablet 90 tablet 1     Sig: Take 1 tablet by mouth daily    pantoprazole (PROTONIX) 40 MG tablet 90 tablet 1     Sig: Take 1 tablet by mouth daily take 1 tablet by mouth once daily     No follow-ups on file.      Orders Placed This Encounter   Medications    losartan-hydroCHLOROthiazide (HYZAAR) 50-12.5 MG per tablet     Sig: Take 1 tablet by mouth daily     Dispense:  90 tablet     Refill:  1    pantoprazole (PROTONIX) 40 MG tablet     Sig: Take 1 tablet by mouth daily take 1 tablet by mouth once daily     Dispense:  90 tablet     Refill:  1     Orders Placed This Encounter   Procedures    External Referral To Dermatology     Referral Priority:   Routine     Referral Type:   Eval and Treat     Referral Reason:   Specialty Services Required     Referred to Provider:   Alma Palumbo MD     Requested Specialty:   Dermatology     Number of Visits Requested:   1         Patient Instructions   SURVEY:    You may be receiving a survey from [employer] regarding your visit today. Please complete the survey to enable us to provide the highest quality of care to you and your family. If you cannot score us a very good (5 Stars) on any question, please call the office to discuss how we could have made your experience a very good one. Thank you. Clinical Care Team: NICO Naylor LPN    Clerical Team: Anibal Merino        Electronically signed by JOHNATHAN Naylor CNP on 10/21/2021 at 1:21 PM           Completed Refills      Requested Prescriptions     Signed Prescriptions Disp Refills    losartan-hydroCHLOROthiazide (HYZAAR) 50-12.5 MG per tablet 90 tablet 1     Sig: Take 1 tablet by mouth daily    pantoprazole (PROTONIX) 40 MG tablet 90 tablet 1     Sig: Take 1 tablet by mouth daily take 1 tablet by mouth once daily         Yves received counseling on the following healthy behaviors: nutrition, exercise, medication adherence and tobacco cessation  Reviewed prior labs and health maintenance. Continue current medications, diet and exercise. Discussed use, benefit, and side effects of prescribed medications. Barriers to medication compliance addressed. Patient given educational materials - see patient instructions. All patient questions answered. Patient voiced understanding.

## 2022-04-19 DIAGNOSIS — K21.9 GASTROESOPHAGEAL REFLUX DISEASE WITHOUT ESOPHAGITIS: ICD-10-CM

## 2022-04-19 RX ORDER — LOSARTAN POTASSIUM AND HYDROCHLOROTHIAZIDE 12.5; 5 MG/1; MG/1
1 TABLET ORAL DAILY
Qty: 90 TABLET | Refills: 1 | Status: SHIPPED | OUTPATIENT
Start: 2022-04-19 | End: 2022-10-21 | Stop reason: SDUPTHER

## 2022-04-19 RX ORDER — PANTOPRAZOLE SODIUM 40 MG/1
40 TABLET, DELAYED RELEASE ORAL DAILY
Qty: 90 TABLET | Refills: 1 | Status: SHIPPED | OUTPATIENT
Start: 2022-04-19 | End: 2022-10-21 | Stop reason: SDUPTHER

## 2022-04-19 NOTE — TELEPHONE ENCOUNTER
Last OV: 10/21/2021 chronic  Last RX:    Next scheduled apt: Visit date not found          Pt requesting a refill

## 2022-10-17 RX ORDER — LOSARTAN POTASSIUM AND HYDROCHLOROTHIAZIDE 12.5; 5 MG/1; MG/1
TABLET ORAL
Qty: 90 TABLET | Refills: 1 | OUTPATIENT
Start: 2022-10-17

## 2022-10-17 NOTE — TELEPHONE ENCOUNTER
Last OV: 10/21/2021 CHRONIC   Last RX:    Next scheduled apt: 10/24/2022   CHRONIC           Surescript requesting a refill

## 2022-10-21 DIAGNOSIS — K21.9 GASTROESOPHAGEAL REFLUX DISEASE WITHOUT ESOPHAGITIS: ICD-10-CM

## 2022-10-21 RX ORDER — LOSARTAN POTASSIUM AND HYDROCHLOROTHIAZIDE 12.5; 5 MG/1; MG/1
1 TABLET ORAL DAILY
Qty: 30 TABLET | Refills: 0 | Status: SHIPPED | OUTPATIENT
Start: 2022-10-21

## 2022-10-21 RX ORDER — PANTOPRAZOLE SODIUM 40 MG/1
40 TABLET, DELAYED RELEASE ORAL DAILY
Qty: 30 TABLET | Refills: 0 | Status: SHIPPED | OUTPATIENT
Start: 2022-10-21

## 2022-10-21 NOTE — TELEPHONE ENCOUNTER
Last OV: 10/21/2021 chronic   Last RX:    Next scheduled apt: Visit date not found         Pt requesting a 30 day supply

## 2022-10-21 NOTE — TELEPHONE ENCOUNTER
Hyzaar 50-12.5 mg  Protonix 40 mg    30 day supply to [facility]    Frannie Verma had an appointment for Monday afternoon but had to cancel due to having a family emergency. Patient needs to go to [place] and is leaving tomorrow. Please let Frannie Verma know if we can send in a 30 day supply. He will make an appointment before he runs out. Health Maintenance   Topic Date Due    Pneumococcal 65+ years Vaccine (1 - PCV) Never done    HIV screen  Never done    Hepatitis C screen  Never done    DTaP/Tdap/Td vaccine (1 - Tdap) Never done    Shingles vaccine (1 of 2) Never done    COVID-19 Vaccine (3 - Booster for Pfizer series) 06/02/2021    Colorectal Cancer Screen  09/10/2021    Depression Screen  04/15/2022    AAA screen  Never done    Flu vaccine (1) Never done    Lipids  04/15/2026    Hepatitis A vaccine  Aged Out    Hib vaccine  Aged Out    Meningococcal (ACWY) vaccine  Aged Out    Low dose CT lung screening  Discontinued             (applicable per patient's age: Cancer Screenings, Depression Screening, Fall Risk Screening, Immunizations)    LDL Cholesterol (mg/dL)   Date Value   04/15/2021 57     AST (U/L)   Date Value   04/15/2021 167 (H)     ALT (U/L)   Date Value   04/15/2021 97 (H)     BUN (mg/dL)   Date Value   04/15/2021 9      (goal A1C is < 7)   (goal LDL is <100) need 30-50% reduction from baseline     BP Readings from Last 3 Encounters:   10/21/21 128/82   04/15/21 130/82   06/30/20 128/86    (goal /80)      All Future Testing planned in [facility]:      Next Visit Date:  No future appointments.          Patient Active Problem List:     Guaiac positive stools     Family history of colon cancer     Gastroesophageal reflux disease without esophagitis     History of chronic gastritis     Rectal bleeding     Primary hypertension

## 2022-11-21 ENCOUNTER — OFFICE VISIT (OUTPATIENT)
Dept: FAMILY MEDICINE CLINIC | Age: 67
End: 2022-11-21
Payer: MEDICARE

## 2022-11-21 ENCOUNTER — HOSPITAL ENCOUNTER (OUTPATIENT)
Age: 67
Discharge: HOME | End: 2022-11-21
Payer: MEDICARE

## 2022-11-21 VITALS
TEMPERATURE: 97.9 F | SYSTOLIC BLOOD PRESSURE: 138 MMHG | DIASTOLIC BLOOD PRESSURE: 80 MMHG | OXYGEN SATURATION: 97 % | HEART RATE: 68 BPM | HEIGHT: 69 IN | WEIGHT: 181 LBS | BODY MASS INDEX: 26.81 KG/M2

## 2022-11-21 DIAGNOSIS — Z00.00 WELCOME TO MEDICARE PREVENTIVE VISIT: Primary | ICD-10-CM

## 2022-11-21 DIAGNOSIS — K21.9 GASTROESOPHAGEAL REFLUX DISEASE WITHOUT ESOPHAGITIS: ICD-10-CM

## 2022-11-21 DIAGNOSIS — Z13.220 SCREENING FOR HYPERLIPIDEMIA: ICD-10-CM

## 2022-11-21 DIAGNOSIS — I10 PRIMARY HYPERTENSION: ICD-10-CM

## 2022-11-21 DIAGNOSIS — Z00.00 WELCOME TO MEDICARE PREVENTIVE VISIT: ICD-10-CM

## 2022-11-21 DIAGNOSIS — M16.11 PRIMARY OSTEOARTHRITIS OF RIGHT HIP: ICD-10-CM

## 2022-11-21 DIAGNOSIS — Z13.6 ENCOUNTER FOR ABDOMINAL AORTIC ANEURYSM (AAA) SCREENING: ICD-10-CM

## 2022-11-21 LAB
ABSOLUTE EOS #: 0.1 K/UL (ref 0–0.4)
ABSOLUTE LYMPH #: 1.5 K/UL (ref 1–4.8)
ABSOLUTE MONO #: 0.5 K/UL (ref 0–1)
ALBUMIN SERPL-MCNC: 3.8 G/DL (ref 3.5–5.2)
ALP BLD-CCNC: 119 U/L (ref 40–129)
ALT SERPL-CCNC: 59 U/L (ref 5–41)
ANION GAP SERPL CALCULATED.3IONS-SCNC: 10 MMOL/L (ref 9–17)
AST SERPL-CCNC: 108 U/L
BASOPHILS # BLD: 1 % (ref 0–2)
BASOPHILS ABSOLUTE: 0 K/UL (ref 0–0.2)
BILIRUB SERPL-MCNC: 1.9 MG/DL (ref 0.3–1.2)
BUN BLDV-MCNC: 10 MG/DL (ref 8–23)
BUN/CREAT BLD: 13 (ref 9–20)
CALCIUM SERPL-MCNC: 9 MG/DL (ref 8.6–10.4)
CHLORIDE BLD-SCNC: 95 MMOL/L (ref 98–107)
CO2: 24 MMOL/L (ref 20–31)
CREAT SERPL-MCNC: 0.79 MG/DL (ref 0.7–1.2)
EOSINOPHILS RELATIVE PERCENT: 2 % (ref 0–5)
GFR SERPL CREATININE-BSD FRML MDRD: >60 ML/MIN/1.73M2
GLUCOSE BLD-MCNC: 91 MG/DL (ref 70–99)
HCT VFR BLD CALC: 42.6 % (ref 41–53)
HEMOGLOBIN: 14.5 G/DL (ref 13.5–17.5)
LYMPHOCYTES # BLD: 30 % (ref 13–44)
MCH RBC QN AUTO: 31.1 PG (ref 26–34)
MCHC RBC AUTO-ENTMCNC: 34 G/DL (ref 31–37)
MCV RBC AUTO: 91.5 FL (ref 80–100)
MONOCYTES # BLD: 11 % (ref 5–9)
MORPHOLOGY: ABNORMAL
MORPHOLOGY: ABNORMAL
PATIENT FASTING?: YES
PDW BLD-RTO: 13.5 % (ref 12.1–15.2)
PLATELET # BLD: 81 K/UL (ref 140–450)
POTASSIUM SERPL-SCNC: 4.4 MMOL/L (ref 3.7–5.3)
RBC # BLD: 4.66 M/UL (ref 4.5–5.9)
SEG NEUTROPHILS: 56 % (ref 39–75)
SEGMENTED NEUTROPHILS ABSOLUTE COUNT: 2.7 K/UL (ref 2.1–6.5)
SODIUM BLD-SCNC: 129 MMOL/L (ref 135–144)
TOTAL PROTEIN: 8.2 G/DL (ref 6.4–8.3)
WBC # BLD: 4.9 K/UL (ref 3.5–11)

## 2022-11-21 PROCEDURE — 3078F DIAST BP <80 MM HG: CPT | Performed by: NURSE PRACTITIONER

## 2022-11-21 PROCEDURE — 99214 OFFICE O/P EST MOD 30 MIN: CPT | Performed by: NURSE PRACTITIONER

## 2022-11-21 PROCEDURE — G8427 DOCREV CUR MEDS BY ELIG CLIN: HCPCS | Performed by: NURSE PRACTITIONER

## 2022-11-21 PROCEDURE — 3074F SYST BP LT 130 MM HG: CPT | Performed by: NURSE PRACTITIONER

## 2022-11-21 PROCEDURE — 85025 COMPLETE CBC W/AUTO DIFF WBC: CPT

## 2022-11-21 PROCEDURE — 36415 COLL VENOUS BLD VENIPUNCTURE: CPT

## 2022-11-21 PROCEDURE — 3017F COLORECTAL CA SCREEN DOC REV: CPT | Performed by: NURSE PRACTITIONER

## 2022-11-21 PROCEDURE — 80061 LIPID PANEL: CPT

## 2022-11-21 PROCEDURE — G8419 CALC BMI OUT NRM PARAM NOF/U: HCPCS | Performed by: NURSE PRACTITIONER

## 2022-11-21 PROCEDURE — 1123F ACP DISCUSS/DSCN MKR DOCD: CPT | Performed by: NURSE PRACTITIONER

## 2022-11-21 PROCEDURE — G0402 INITIAL PREVENTIVE EXAM: HCPCS | Performed by: NURSE PRACTITIONER

## 2022-11-21 PROCEDURE — 4004F PT TOBACCO SCREEN RCVD TLK: CPT | Performed by: NURSE PRACTITIONER

## 2022-11-21 PROCEDURE — G8484 FLU IMMUNIZE NO ADMIN: HCPCS | Performed by: NURSE PRACTITIONER

## 2022-11-21 PROCEDURE — 80053 COMPREHEN METABOLIC PANEL: CPT

## 2022-11-21 RX ORDER — PANTOPRAZOLE SODIUM 40 MG/1
40 TABLET, DELAYED RELEASE ORAL DAILY
Qty: 90 TABLET | Refills: 1 | Status: SHIPPED | OUTPATIENT
Start: 2022-11-21

## 2022-11-21 RX ORDER — LOSARTAN POTASSIUM AND HYDROCHLOROTHIAZIDE 12.5; 5 MG/1; MG/1
1 TABLET ORAL DAILY
Qty: 90 TABLET | Refills: 1 | Status: SHIPPED | OUTPATIENT
Start: 2022-11-21

## 2022-11-21 RX ORDER — NAPROXEN 500 MG/1
500 TABLET ORAL 2 TIMES DAILY WITH MEALS
Qty: 180 TABLET | Refills: 2 | Status: SHIPPED | OUTPATIENT
Start: 2022-11-21

## 2022-11-21 SDOH — ECONOMIC STABILITY: FOOD INSECURITY: WITHIN THE PAST 12 MONTHS, THE FOOD YOU BOUGHT JUST DIDN'T LAST AND YOU DIDN'T HAVE MONEY TO GET MORE.: NEVER TRUE

## 2022-11-21 SDOH — ECONOMIC STABILITY: FOOD INSECURITY: WITHIN THE PAST 12 MONTHS, YOU WORRIED THAT YOUR FOOD WOULD RUN OUT BEFORE YOU GOT MONEY TO BUY MORE.: NEVER TRUE

## 2022-11-21 ASSESSMENT — PATIENT HEALTH QUESTIONNAIRE - PHQ9
SUM OF ALL RESPONSES TO PHQ QUESTIONS 1-9: 0
SUM OF ALL RESPONSES TO PHQ QUESTIONS 1-9: 0
2. FEELING DOWN, DEPRESSED OR HOPELESS: 0
SUM OF ALL RESPONSES TO PHQ QUESTIONS 1-9: 0
1. LITTLE INTEREST OR PLEASURE IN DOING THINGS: 0
SUM OF ALL RESPONSES TO PHQ9 QUESTIONS 1 & 2: 0
SUM OF ALL RESPONSES TO PHQ QUESTIONS 1-9: 0

## 2022-11-21 ASSESSMENT — LIFESTYLE VARIABLES
HOW OFTEN DURING THE LAST YEAR HAVE YOU FOUND THAT YOU WERE NOT ABLE TO STOP DRINKING ONCE YOU HAD STARTED: 0
HOW OFTEN DURING THE LAST YEAR HAVE YOU FAILED TO DO WHAT WAS NORMALLY EXPECTED FROM YOU BECAUSE OF DRINKING: 0
HOW OFTEN DURING THE LAST YEAR HAVE YOU BEEN UNABLE TO REMEMBER WHAT HAPPENED THE NIGHT BEFORE BECAUSE YOU HAD BEEN DRINKING: 0
HOW OFTEN DURING THE LAST YEAR HAVE YOU NEEDED AN ALCOHOLIC DRINK FIRST THING IN THE MORNING TO GET YOURSELF GOING AFTER A NIGHT OF HEAVY DRINKING: 0
HOW OFTEN DO YOU HAVE A DRINK CONTAINING ALCOHOL: 4 OR MORE TIMES A WEEK
HAVE YOU OR SOMEONE ELSE BEEN INJURED AS A RESULT OF YOUR DRINKING: 0
HOW MANY STANDARD DRINKS CONTAINING ALCOHOL DO YOU HAVE ON A TYPICAL DAY: 5 OR 6
HAS A RELATIVE, FRIEND, DOCTOR, OR ANOTHER HEALTH PROFESSIONAL EXPRESSED CONCERN ABOUT YOUR DRINKING OR SUGGESTED YOU CUT DOWN: 0
HOW OFTEN DURING THE LAST YEAR HAVE YOU HAD A FEELING OF GUILT OR REMORSE AFTER DRINKING: 0

## 2022-11-21 ASSESSMENT — ENCOUNTER SYMPTOMS
VOMITING: 0
GLOBUS SENSATION: 0
DIARRHEA: 0
NAUSEA: 0
SHORTNESS OF BREATH: 0
HEARTBURN: 0
COUGH: 0

## 2022-11-21 ASSESSMENT — SOCIAL DETERMINANTS OF HEALTH (SDOH): HOW HARD IS IT FOR YOU TO PAY FOR THE VERY BASICS LIKE FOOD, HOUSING, MEDICAL CARE, AND HEATING?: NOT HARD AT ALL

## 2022-11-21 NOTE — PROGRESS NOTES
Medicare Annual Wellness Visit    Sandra Singer is here for Medicare AWV (Welcome to Medicare visit.), Hypertension, and Gastroesophageal Reflux    Assessment & Plan   Encounter for abdominal aortic aneurysm (AAA) screening  Gastroesophageal reflux disease without esophagitis  The following orders have not been finalized:  -     pantoprazole (PROTONIX) 40 MG tablet  Primary osteoarthritis of right hip  The following orders have not been finalized:  -     naproxen 500 MG EC tablet    Recommendations for Preventive Services Due: see orders and patient instructions/AVS.  Recommended screening schedule for the next 5-10 years is provided to the patient in written form: see Patient Instructions/AVS.     No follow-ups on file. Subjective   The following acute and/or chronic problems were also addressed today:    Patient's complete Health Risk Assessment and screening values have been reviewed and are found in Flowsheets. The following problems were reviewed today and where indicated follow up appointments were made and/or referrals ordered. Positive Risk Factor Screenings with Interventions:       Tobacco Use:  Tobacco Use: High Risk    Smoking Tobacco Use: Every Day    Smokeless Tobacco Use: Former    Passive Exposure: Not on file     E-cigarette/Vaping       Questions Responses    E-cigarette/Vaping Use Never User    Start Date     Passive Exposure     Quit Date     Counseling Given     Comments           Substance Use - Tobacco Interventions:  tobacco cessation tips and resources provided    Alcohol Screening:  Alcohol Use: Heavy Drinker    Frequency of Alcohol Consumption: 4 or more times a week    Average Number of Drinks: 5 or 6    Frequency of Binge Drinking: Weekly     AUDIT-C Score: 9  AUDIT Total Score: 9  An AUDIT-C score of 3-7 indicates potential alcohol risk. An AUDIT Total score of 8 or more is associated with harmful or hazardous drinking.  A score of 13 or more in women, and 15 or more in men, is likely to indicate alcohol dependence. Substance Use - Alcohol Interventions:  Reviewed recommended alcohol consumption guidelines with the patient        General Health and ACP:  General  In general, how would you say your health is?: Very Good  In the past 7 days, have you experienced any of the following: New or Increased Pain, New or Increased Fatigue, Loneliness, Social Isolation, Stress or Anger?: No  Do you get the social and emotional support that you need?: Yes  Do you have a Living Will?: (!) No    Advance Directives       Power of [facility] Will ACP-Advance Directive ACP-Power of     Not on File Not on File Not on File Not on File        General Health Risk Interventions:  No Living Will: [address] addressed with patient today    Health Habits/Nutrition:  Physical Activity: Insufficiently Active    Days of Exercise per Week: 2 days    Minutes of Exercise per Session: 20 min     Have you lost any weight without trying in the past 3 months?: No  Body mass index: (!) 26.73  Have you seen the dentist within the past year?: (!) No  Health Habits/Nutrition Interventions:  Inadequate physical activity:  patient agrees to exercise for at least 150 minutes/week    Hearing/Vision:  Do you or your family notice any trouble with your hearing that hasn't been managed with hearing aids?: No  Do you have difficulty driving, watching TV, or doing any of your daily activities because of your eyesight?: No  Have you had an eye exam within the past year?: (!) No  No results found. Hearing/Vision Interventions:               Objective   Vitals:    11/21/22 1329   BP: 138/80   Pulse: 68   Temp: 97.9 °F (36.6 °C)   TempSrc: Oral   SpO2: 97%   Weight: 181 lb (82.1 kg)   Height: 5' 9\" (1.753 m)      Body mass index is 26.73 kg/m².       General Appearance: alert and oriented to person, place and time, well developed and well- nourished, in no acute distress  Skin: warm and dry, no rash or erythema  Head: normocephalic and atraumatic  Eyes: pupils equal, round, and reactive to light, extraocular eye movements intact, conjunctivae normal  ENT: tympanic membrane, external ear and ear canal normal bilaterally, nose without deformity, nasal mucosa and turbinates normal without polyps  Neck: supple and non-tender without mass, no thyromegaly or thyroid nodules, no cervical lymphadenopathy  Pulmonary/Chest: clear to auscultation bilaterally- no wheezes, rales or rhonchi, normal air movement, no respiratory distress  Cardiovascular: normal rate, regular rhythm, normal S1 and S2, no murmurs, rubs, clicks, or gallops, distal pulses intact, no carotid bruits  Abdomen: soft, non-tender, non-distended, normal bowel sounds, no masses or organomegaly  Extremities: no cyanosis, clubbing or edema  Musculoskeletal: normal range of motion, no joint swelling, deformity or tenderness  Neurologic: reflexes normal and symmetric, no cranial nerve deficit, gait, coordination and speech normal       Allergies   Allergen Reactions    Penicillins Rash     Prior to Visit Medications    Medication Sig Taking?  Authorizing Provider   losartan-hydroCHLOROthiazide (HYZAAR) 50-12.5 MG per tablet Take 1 tablet by mouth daily Yes JOHNATHAN Meredith CNP   pantoprazole (PROTONIX) 40 MG tablet Take 1 tablet by mouth daily take 1 tablet by mouth once daily Yes JOHNATHAN Meredith CNP   naproxen (NAPROXEN) 500 MG EC tablet Take 1 tablet by mouth 2 times daily (with meals) Yes JOHNATHAN Meredith CNP       CareTeam (Including outside providers/suppliers regularly involved in providing care):   Patient Care Team:  JOHNATHAN Meredith CNP as PCP - General (Family Nurse Practitioner)  JOHNATHAN Meredith CNP as PCP - [facility] Empaneled Provider  Dayton Sharp MD as Surgeon (General Surgery)     Reviewed and updated this visit:  Tobacco  Allergies  Meds  Med Hx  Surg Hx  Soc Hx  Fam Hx

## 2022-11-21 NOTE — PATIENT INSTRUCTIONS
Personalized Preventive Plan for Marianne Bassett - 11/21/2022  Medicare offers a range of preventive health benefits. Some of the tests and screenings are paid in full while other may be subject to a deductible, co-insurance, and/or copay. Some of these benefits include a comprehensive review of your medical history including lifestyle, illnesses that may run in your family, and various assessments and screenings as appropriate. After reviewing your medical record and screening and assessments performed today your provider may have ordered immunizations, labs, imaging, and/or referrals for you. A list of these orders (if applicable) as well as your Preventive Care list are included within your After Visit Summary for your review. Other Preventive Recommendations:    A preventive eye exam performed by an eye specialist is recommended every 1-2 years to screen for glaucoma; cataracts, macular degeneration, and other eye disorders. A preventive dental visit is recommended every 6 months. Try to get at least 150 minutes of exercise per week or 10,000 steps per day on a pedometer . Order or download the FREE \"Exercise & Physical Activity: Your Everyday Guide\" from [employer]. Call 7-953.487.6358 or search [employer] online. You need 0962-6352 mg of calcium and 5969-6313 IU of vitamin D per day. It is possible to meet your calcium requirement with diet alone, but a vitamin D supplement is usually necessary to meet this goal.  When exposed to the sun, use a sunscreen that protects against both UVA and UVB radiation with an SPF of 30 or greater. Reapply every 2 to 3 hours or after sweating, drying off with a towel, or swimming. Always wear a seat belt when traveling in a car. Always wear a helmet when riding a bicycle or motorcycle.

## 2022-11-21 NOTE — PROGRESS NOTES
HPI Notes    Name: Miko Avalos  : 1955         Chief Complaint:     Chief Complaint   Patient presents with    Medicare AWV     Welcome to Medicare visit. Hypertension    Gastroesophageal Reflux       History of Present Illness:        Hypertension  This is a chronic problem. The current episode started more than 1 year ago. The problem has been gradually improving since onset. The problem is controlled. Pertinent negatives include no chest pain, headaches, palpitations, peripheral edema or shortness of breath. Risk factors for coronary artery disease include dyslipidemia and male gender. Past treatments include angiotensin blockers and diuretics. There are no compliance problems. Gastroesophageal Reflux  He reports no chest pain, no coughing, no globus sensation, no heartburn or no nausea. This is a chronic problem. The current episode started more than 1 year ago. The problem occurs rarely. The problem has been waxing and waning. Nothing aggravates the symptoms. Risk factors include caffeine use, lack of exercise and obesity. He has tried a PPI for the symptoms. The treatment provided moderate relief.        Past Medical History:     Past Medical History:   Diagnosis Date    Back pain     Head injury     head injury from a fall in 5th grade    History of colon polyps 2016    Skull fracture ([facility] [address]       Reviewed all health maintenance requirements and ordered appropriate tests  Health Maintenance Due   Topic Date Due    Pneumococcal 65+ years Vaccine (1 - PCV) Never done    HIV screen  Never done    Hepatitis C screen  Never done    DTaP/Tdap/Td vaccine (1 - Tdap) Never done    Shingles vaccine (1 of 2) Never done    Colorectal Cancer Screen  09/10/2021    COVID-19 Vaccine (4 - Booster for [employer] series) 2022    AAA screen  Never done    Flu vaccine (1) Never done       Past Surgical History:     Past Surgical History:   Procedure Laterality Date    COLONOSCOPY  11/15/2016    Tammy Mcclain MD COLONOSCOPY N/A 9/10/2018    COLONOSCOPY POLYPECTOMY HOT BIOPSY performed by Arnie Ignacio MD at [address] [place] [address]      as child    UPPER GASTROINTESTINAL ENDOSCOPY  11/15/2016    Dara Okeefe MD    UPPER GASTROINTESTINAL ENDOSCOPY N/A 9/10/2018    EGD BIOPSY performed by Arnie Ignacio MD at [facility] OR        Medications:       Prior to Admission medications    Medication Sig Start Date End Date Taking? Authorizing Provider   losartan-hydroCHLOROthiazide (HYZAAR) 50-12.5 MG per tablet Take 1 tablet by mouth daily 11/21/22  Yes JOHNATHAN Robles - CNP   pantoprazole (PROTONIX) 40 MG tablet Take 1 tablet by mouth daily take 1 tablet by mouth once daily 11/21/22  Yes JOHNATHAN Robles - CNP   naproxen 500 MG EC tablet Take 1 tablet by mouth 2 times daily (with meals) 11/21/22  Yes JOHNATHAN Robles CNP        Allergies:       Penicillins    Social History:     Tobacco:    reports that he has been smoking cigarettes. He started smoking about 48 years ago. He has a 50.00 pack-year smoking history. He has quit using smokeless tobacco.  Alcohol:      reports current alcohol use of about 5.0 standard drinks per week. Drug Use:  reports no history of drug use. Family History:     Family History   Problem Relation Age of Onset    Cancer Paternal Grandmother         colon cancer       Review of Systems:         Review of Systems   Constitutional:  Negative for chills and fever. Respiratory:  Negative for cough and shortness of breath. Cardiovascular:  Negative for chest pain and palpitations. Gastrointestinal:  Negative for diarrhea, heartburn, nausea and vomiting. Neurological:  Negative for dizziness, seizures and headaches. Physical Exam:     Vitals:  /80   Pulse 68   Temp 97.9 °F (36.6 °C) (Oral)   Ht 5' 9\" (1.753 m)   Wt 181 lb (82.1 kg)   SpO2 97%   BMI 26.73 kg/m²       Physical Exam  Vitals and nursing note reviewed.    Constitutional: Appearance: He is well-developed. Cardiovascular:      Rate and Rhythm: Normal rate and regular rhythm. Heart sounds: S1 normal and S2 normal.   Pulmonary:      Effort: Pulmonary effort is normal. No respiratory distress. Breath sounds: Normal breath sounds. Abdominal:      General: Bowel sounds are normal.      Palpations: Abdomen is soft. Tenderness: There is no abdominal tenderness. Skin:     General: Skin is warm and dry. Psychiatric:         Behavior: Behavior is cooperative. Data:     Lab Results   Component Value Date/Time     04/15/2021 02:39 PM    K 4.2 04/15/2021 02:39 PM    CL 96 04/15/2021 02:39 PM    CO2 25 04/15/2021 02:39 PM    BUN 9 04/15/2021 02:39 PM    CREATININE 0.71 04/15/2021 02:39 PM    GLUCOSE 95 04/15/2021 02:39 PM    PROT 8.0 04/15/2021 02:39 PM    LABALBU 3.9 04/15/2021 02:39 PM    BILITOT 1.43 04/15/2021 02:39 PM    ALKPHOS 107 04/15/2021 02:39 PM     04/15/2021 02:39 PM    ALT 97 04/15/2021 02:39 PM     Lab Results   Component Value Date/Time    WBC 5.5 04/15/2021 02:39 PM    RBC 4.79 04/15/2021 02:39 PM    HGB 15.2 04/15/2021 02:39 PM    HCT 43.6 04/15/2021 02:39 PM    MCV 91.1 04/15/2021 02:39 PM    MCH 31.7 04/15/2021 02:39 PM    MCHC 34.8 04/15/2021 02:39 PM    RDW 13.3 04/15/2021 02:39 PM     04/15/2021 02:39 PM    MPV NOT REPORTED 04/15/2021 02:39 PM     No results found for: TSH  Lab Results   Component Value Date/Time    CHOL 116 04/15/2021 02:39 PM    HDL 46 04/15/2021 02:39 PM          Assessment & Plan        Diagnosis Orders   1. Welcome to Medicare preventive visit   ----Medicare annual wellness exam completed. Patient educated about adequate water intake  Patient educated about nutrition  Patient educated about exercise and fitness  Patient educated about fall reduction  Patient educated about importance of advanced directives. Health maintenance requirements reviewed with patient         2.  Primary hypertension --BP well controlled. Continue same medications. 3. Gastroesophageal reflux disease without esophagitis   --symptoms well controlled with PPI. Continue protonix. 4. Primary osteoarthritis of right hip  --pt does well with naproxen        5. Encounter for abdominal aortic aneurysm (AAA) screening          Health maintenance reviewed with pt    Patient verbalizes understanding and agreement with plan. All questions answered. If symptoms do not resolve or worsen, return to office. Completed Refills   Requested Prescriptions     Signed Prescriptions Disp Refills    losartan-hydroCHLOROthiazide (HYZAAR) 50-12.5 MG per tablet 90 tablet 1     Sig: Take 1 tablet by mouth daily    pantoprazole (PROTONIX) 40 MG tablet 90 tablet 1     Sig: Take 1 tablet by mouth daily take 1 tablet by mouth once daily    naproxen 500 MG EC tablet 180 tablet 2     Sig: Take 1 tablet by mouth 2 times daily (with meals)     Return for Medicare Annual Wellness Visit in 1 year. Orders Placed This Encounter   Medications    losartan-hydroCHLOROthiazide (HYZAAR) 50-12.5 MG per tablet     Sig: Take 1 tablet by mouth daily     Dispense:  90 tablet     Refill:  1    pantoprazole (PROTONIX) 40 MG tablet     Sig: Take 1 tablet by mouth daily take 1 tablet by mouth once daily     Dispense:  90 tablet     Refill:  1    naproxen 500 MG EC tablet     Sig: Take 1 tablet by mouth 2 times daily (with meals)     Dispense:  180 tablet     Refill:  2       Orders Placed This Encounter   Procedures    US SCREENING FOR AAA     This procedure can be scheduled via airpim. Access your airpim account by visiting [email or web address].      Standing Status:   Future     Standing Expiration Date:   11/21/2023     Order Specific Question:   Reason for exam:     Answer:   screening    CBC with Auto Differential     Standing Status:   Future     Standing Expiration Date:   12/26/2023    Comprehensive Metabolic Panel     Standing Status:   Future Standing Expiration Date:   12/26/2023    Lipid Panel     Standing Status:   Future     Standing Expiration Date:   12/26/2023           Patient Instructions   Personalized Preventive Plan for Ana Jacobs - 11/21/2022  Medicare offers a range of preventive health benefits. Some of the tests and screenings are paid in full while other may be subject to a deductible, co-insurance, and/or copay. Some of these benefits include a comprehensive review of your medical history including lifestyle, illnesses that may run in your family, and various assessments and screenings as appropriate. After reviewing your medical record and screening and assessments performed today your provider may have ordered immunizations, labs, imaging, and/or referrals for you. A list of these orders (if applicable) as well as your Preventive Care list are included within your After Visit Summary for your review. Other Preventive Recommendations:    A preventive eye exam performed by an eye specialist is recommended every 1-2 years to screen for glaucoma; cataracts, macular degeneration, and other eye disorders. A preventive dental visit is recommended every 6 months. Try to get at least 150 minutes of exercise per week or 10,000 steps per day on a pedometer . Order or download the FREE \"Exercise & Physical Activity: Your Everyday Guide\" from [employer]. Call 2-583.281.7015 or search [employer] online. You need 2511-5283 mg of calcium and 8883-1708 IU of vitamin D per day. It is possible to meet your calcium requirement with diet alone, but a vitamin D supplement is usually necessary to meet this goal.  When exposed to the sun, use a sunscreen that protects against both UVA and UVB radiation with an SPF of 30 or greater. Reapply every 2 to 3 hours or after sweating, drying off with a towel, or swimming. Always wear a seat belt when traveling in a car.  Always wear a helmet when riding a bicycle or motorcycle.    Electronically signed by Paris Eisenmenger, APRN - CNP on 11/21/2022 at 2:03 PM           Completed Refills   Requested Prescriptions     Signed Prescriptions Disp Refills    losartan-hydroCHLOROthiazide (HYZAAR) 50-12.5 MG per tablet 90 tablet 1     Sig: Take 1 tablet by mouth daily    pantoprazole (PROTONIX) 40 MG tablet 90 tablet 1     Sig: Take 1 tablet by mouth daily take 1 tablet by mouth once daily    naproxen 500 MG EC tablet 180 tablet 2     Sig: Take 1 tablet by mouth 2 times daily (with meals)

## 2022-11-22 LAB
CHOLESTEROL/HDL RATIO: 2.3
CHOLESTEROL: 123 MG/DL
HDLC SERPL-MCNC: 54 MG/DL
LDL CHOLESTEROL: 56 MG/DL (ref 0–130)
TRIGL SERPL-MCNC: 65 MG/DL

## 2023-05-19 DIAGNOSIS — K21.9 GASTROESOPHAGEAL REFLUX DISEASE WITHOUT ESOPHAGITIS: ICD-10-CM

## 2023-05-19 RX ORDER — LOSARTAN POTASSIUM AND HYDROCHLOROTHIAZIDE 12.5; 5 MG/1; MG/1
1 TABLET ORAL DAILY
Qty: 90 TABLET | Refills: 1 | Status: SHIPPED | OUTPATIENT
Start: 2023-05-19

## 2023-05-19 RX ORDER — PANTOPRAZOLE SODIUM 40 MG/1
40 TABLET, DELAYED RELEASE ORAL DAILY
Qty: 90 TABLET | Refills: 1 | Status: SHIPPED | OUTPATIENT
Start: 2023-05-19

## 2023-05-19 NOTE — TELEPHONE ENCOUNTER
Hyzaar 50-12.5 mg  Protonix 40 mg    Kourtney    Last check up 11/21/22    Will call back and schedule check up when he gets back from [place]. Health Maintenance   Topic Date Due    Pneumococcal 65+ years Vaccine (1 - PCV) Never done    Hepatitis C screen  Never done    DTaP/Tdap/Td vaccine (1 - Tdap) Never done    Shingles vaccine (1 of 2) Never done    Colorectal Cancer Screen  09/10/2021    COVID-19 Vaccine (4 - Booster for [employer] series) 02/07/2022    AAA screen  Never done    Flu vaccine (Season Ended) 08/01/2023    Depression Screen  11/21/2023    Annual Wellness Visit (AWV)  11/22/2023    Lipids  11/21/2027    Hepatitis A vaccine  Aged Out    Hib vaccine  Aged Out    Meningococcal (ACWY) vaccine  Aged Out    Low dose CT lung screening  Discontinued             (applicable per patient's age: Cancer Screenings, Depression Screening, Fall Risk Screening, Immunizations)    LDL Cholesterol (mg/dL)   Date Value   11/21/2022 56     AST (U/L)   Date Value   11/21/2022 108 (H)     ALT (U/L)   Date Value   11/21/2022 59 (H)     BUN (mg/dL)   Date Value   11/21/2022 10      (goal A1C is < 7)   (goal LDL is <100) need 30-50% reduction from baseline     BP Readings from Last 3 Encounters:   11/21/22 138/80   10/21/21 128/82   04/15/21 130/82    (goal /80)      All Future Testing planned in [facility]:  Lab Frequency Next Occurrence   US SCREENING FOR AAA Once 11/21/2022       Next Visit Date:  No future appointments.          Patient Active Problem List:     Guaiac positive stools     Family history of colon cancer     Gastroesophageal reflux disease without esophagitis     History of chronic gastritis     Rectal bleeding     Primary hypertension

## 2023-05-19 NOTE — TELEPHONE ENCOUNTER
Last OV: 11/21/2022 AWV   Last RX:    Next scheduled apt: Visit date not found          Pt requesting a refill to [address] [facility]

## 2023-05-22 RX ORDER — LOSARTAN POTASSIUM AND HYDROCHLOROTHIAZIDE 12.5; 5 MG/1; MG/1
TABLET ORAL
Qty: 90 TABLET | Refills: 1 | OUTPATIENT
Start: 2023-05-22

## 2023-09-20 ENCOUNTER — TELEPHONE (OUTPATIENT)
Dept: FAMILY MEDICINE CLINIC | Age: 68
End: 2023-09-20

## 2023-09-20 NOTE — TELEPHONE ENCOUNTER
----- Message from Tamara Michael sent at 9/19/2023  3:52 PM EDT -----  Subject: Message to Provider    QUESTIONS  Information for Provider? Chastity Forrest from [facility] called to let Dr. Jacinta Saha   that she has recieved the risk assessment for the patients updated care   and there will be virtual rounds. Can RSVP for that please advise and can   be reached at [phone]  ---------------------------------------------------------------------------  --------------  [address]  [phone]; OK to leave message on voicemail  ---------------------------------------------------------------------------  --------------  SCRIPT ANSWERS  Relationship to Patient? Covered Entity  Covered Entity Type? Health Insurance? Representative Name?  Chastity Forrest

## 2023-09-20 NOTE — TELEPHONE ENCOUNTER
----- Message from Quang Kingston sent at 9/19/2023  3:52 PM EDT -----  Subject: Message to Provider    QUESTIONS  Information for Provider? Grayson Galarza from [facility] called to let Dr. To Sanches   that she has recieved the risk assessment for the patients updated care   and there will be virtual rounds. Can RSVP for that please advise and can   be reached at [phone]  ---------------------------------------------------------------------------  --------------  [address]  [phone]; OK to leave message on voicemail  ---------------------------------------------------------------------------  --------------  SCRIPT ANSWERS  Relationship to Patient? Covered Entity  Covered Entity Type? Health Insurance? Representative Name?  Grayson Galarza

## 2023-11-10 DIAGNOSIS — K21.9 GASTROESOPHAGEAL REFLUX DISEASE WITHOUT ESOPHAGITIS: ICD-10-CM

## 2023-11-10 RX ORDER — LOSARTAN POTASSIUM AND HYDROCHLOROTHIAZIDE 12.5; 5 MG/1; MG/1
1 TABLET ORAL DAILY
Qty: 90 TABLET | Refills: 1 | Status: SHIPPED | OUTPATIENT
Start: 2023-11-10

## 2023-11-10 RX ORDER — PANTOPRAZOLE SODIUM 40 MG/1
40 TABLET, DELAYED RELEASE ORAL DAILY
Qty: 90 TABLET | Refills: 1 | Status: SHIPPED | OUTPATIENT
Start: 2023-11-10

## 2023-11-10 NOTE — TELEPHONE ENCOUNTER
Last OV: 11/21/2022 AWV   Last RX:    Next scheduled apt: Visit date not found          [facility]cript requesting a refill

## 2024-03-19 DIAGNOSIS — M16.11 PRIMARY OSTEOARTHRITIS OF RIGHT HIP: ICD-10-CM

## 2024-03-19 RX ORDER — NAPROXEN 500 MG/1
500 TABLET ORAL 2 TIMES DAILY WITH MEALS
Qty: 180 TABLET | Refills: 2 | Status: SHIPPED | OUTPATIENT
Start: 2024-03-19

## 2024-03-19 NOTE — TELEPHONE ENCOUNTER
Rx refill request via "[employer]  Naproxen 500mg BID  Last OV 11/21/22  Pt currently does not have follow up scheduled

## 2024-06-14 DIAGNOSIS — K21.9 GASTROESOPHAGEAL REFLUX DISEASE WITHOUT ESOPHAGITIS: ICD-10-CM

## 2024-06-14 RX ORDER — LOSARTAN POTASSIUM AND HYDROCHLOROTHIAZIDE 12.5; 5 MG/1; MG/1
1 TABLET ORAL DAILY
Qty: 30 TABLET | Refills: 0 | Status: SHIPPED | OUTPATIENT
Start: 2024-06-14

## 2024-06-14 RX ORDER — PANTOPRAZOLE SODIUM 40 MG/1
40 TABLET, DELAYED RELEASE ORAL DAILY
Qty: 30 TABLET | Refills: 0 | Status: SHIPPED | OUTPATIENT
Start: 2024-06-14

## 2024-06-14 NOTE — TELEPHONE ENCOUNTER
Last OV: 11/21/2022   AWV   Last RX:    Next scheduled apt: Visit date not found          No answer no vm

## 2024-07-19 DIAGNOSIS — K21.9 GASTROESOPHAGEAL REFLUX DISEASE WITHOUT ESOPHAGITIS: ICD-10-CM

## 2024-07-19 RX ORDER — LOSARTAN POTASSIUM AND HYDROCHLOROTHIAZIDE 12.5; 5 MG/1; MG/1
1 TABLET ORAL DAILY
Qty: 30 TABLET | Refills: 0 | Status: SHIPPED | OUTPATIENT
Start: 2024-07-19

## 2024-07-19 RX ORDER — PANTOPRAZOLE SODIUM 40 MG/1
40 TABLET, DELAYED RELEASE ORAL DAILY
Qty: 30 TABLET | Refills: 0 | Status: SHIPPED | OUTPATIENT
Start: 2024-07-19

## 2024-07-19 NOTE — TELEPHONE ENCOUNTER
Last OV: 11/21/2022 AWV and Chronic   Last RX:    Next scheduled apt: Visit date not found            Surescript requesting a refill   Medication pending for approval       Called pt to schedule an appointment, pt is out of town.

## 2024-08-17 DIAGNOSIS — K21.9 GASTROESOPHAGEAL REFLUX DISEASE WITHOUT ESOPHAGITIS: ICD-10-CM

## 2024-08-19 RX ORDER — PANTOPRAZOLE SODIUM 40 MG/1
40 TABLET, DELAYED RELEASE ORAL DAILY
Qty: 30 TABLET | Refills: 0 | Status: SHIPPED | OUTPATIENT
Start: 2024-08-19

## 2024-08-19 RX ORDER — LOSARTAN POTASSIUM AND HYDROCHLOROTHIAZIDE 12.5; 5 MG/1; MG/1
1 TABLET ORAL DAILY
Qty: 30 TABLET | Refills: 0 | Status: SHIPPED | OUTPATIENT
Start: 2024-08-19

## 2024-08-19 NOTE — TELEPHONE ENCOUNTER
Last OV 11/21/22     Next OV not scheduled    Requesting refills on losartan-hctz and pantoprazole thru [facility].  Rx pending

## 2024-10-07 DIAGNOSIS — K21.9 GASTROESOPHAGEAL REFLUX DISEASE WITHOUT ESOPHAGITIS: ICD-10-CM

## 2024-10-07 RX ORDER — PANTOPRAZOLE SODIUM 40 MG/1
40 TABLET, DELAYED RELEASE ORAL DAILY
Qty: 15 TABLET | Refills: 0 | Status: SHIPPED | OUTPATIENT
Start: 2024-10-07

## 2024-10-07 RX ORDER — LOSARTAN POTASSIUM AND HYDROCHLOROTHIAZIDE 12.5; 5 MG/1; MG/1
1 TABLET ORAL DAILY
Qty: 15 TABLET | Refills: 0 | Status: SHIPPED | OUTPATIENT
Start: 2024-10-07

## 2024-10-07 NOTE — TELEPHONE ENCOUNTER
Hyzaar 50-12.5 mg  Protonix 40 mg    LOAN-noemí    Check up scheduled for 10/17    Last visit 11/21/22    Please only send 2 weeks in until patient has been seen. Has been almost 2 years since he has been seen.      Health Maintenance   Topic Date Due    Pneumococcal 65+ years Vaccine (1 of 2 - PCV) Never done    Hepatitis C screen  Never done    DTaP/Tdap/Td vaccine (1 - Tdap) Never done    Shingles vaccine (1 of 2) Never done    Respiratory Syncytial Virus (RSV) Pregnant or age 60 yrs+ (1 - 1-dose 60+ series) Never done    Colorectal Cancer Screen  09/10/2021    AAA screen  Never done    Depression Screen  11/21/2023    Annual Wellness Visit (Medicare)  11/27/2023    Flu vaccine (1) Never done    COVID-19 Vaccine (4 - 2023-24 season) 09/01/2024    Lipids  11/21/2027    Hepatitis A vaccine  Aged Out    Hepatitis B vaccine  Aged Out    Hib vaccine  Aged Out    Polio vaccine  Aged Out    Meningococcal (ACWY) vaccine  Aged Out    Lung Cancer Screening &/or Counseling  Discontinued             (applicable per patient's age: Cancer Screenings, Depression Screening, Fall Risk Screening, Immunizations)    AST (U/L)   Date Value   11/21/2022 108 (H)     ALT (U/L)   Date Value   11/21/2022 59 (H)     BUN (mg/dL)   Date Value   11/21/2022 10      (goal A1C is < 7)   (goal LDL is <100) need 30-50% reduction from baseline     BP Readings from Last 3 Encounters:   11/21/22 138/80   10/21/21 128/82   04/15/21 130/82    (goal /80)      All Future Testing planned in [facility]:  Lab Frequency Next Occurrence       Next Visit Date:  Future Appointments   Date Time Provider Department Center   10/17/2024 11:20 AM Nav Ram DNP [facility]            Patient Active Problem List:     Guaiac positive stools     Family history of colon cancer     Gastroesophageal reflux disease without esophagitis     History of chronic gastritis     Rectal bleeding     Primary hypertension

## 2024-10-17 ENCOUNTER — OFFICE VISIT (OUTPATIENT)
Dept: FAMILY MEDICINE CLINIC | Age: 69
End: 2024-10-17

## 2024-10-17 ENCOUNTER — HOSPITAL ENCOUNTER (OUTPATIENT)
Age: 69
Discharge: HOME | End: 2024-10-17
Payer: COMMERCIAL

## 2024-10-17 VITALS
SYSTOLIC BLOOD PRESSURE: 132 MMHG | BODY MASS INDEX: 28.69 KG/M2 | WEIGHT: 193.7 LBS | HEART RATE: 68 BPM | DIASTOLIC BLOOD PRESSURE: 80 MMHG | HEIGHT: 69 IN

## 2024-10-17 DIAGNOSIS — K42.9 UMBILICAL HERNIA WITHOUT OBSTRUCTION AND WITHOUT GANGRENE: ICD-10-CM

## 2024-10-17 DIAGNOSIS — K21.9 GASTROESOPHAGEAL REFLUX DISEASE WITHOUT ESOPHAGITIS: ICD-10-CM

## 2024-10-17 DIAGNOSIS — Z13.220 SCREENING FOR HYPERLIPIDEMIA: ICD-10-CM

## 2024-10-17 DIAGNOSIS — M16.11 PRIMARY OSTEOARTHRITIS OF RIGHT HIP: ICD-10-CM

## 2024-10-17 DIAGNOSIS — Z00.00 INITIAL MEDICARE ANNUAL WELLNESS VISIT: ICD-10-CM

## 2024-10-17 DIAGNOSIS — Z00.00 INITIAL MEDICARE ANNUAL WELLNESS VISIT: Primary | ICD-10-CM

## 2024-10-17 DIAGNOSIS — Z12.11 SCREENING FOR MALIGNANT NEOPLASM OF COLON: ICD-10-CM

## 2024-10-17 DIAGNOSIS — D69.6 THROMBOCYTOPENIA, UNSPECIFIED (HCC): ICD-10-CM

## 2024-10-17 LAB
ALBUMIN SERPL-MCNC: 4.2 G/DL (ref 3.5–5.2)
ALP SERPL-CCNC: 92 U/L (ref 40–129)
ALT SERPL-CCNC: 28 U/L (ref 5–41)
ANION GAP SERPL CALCULATED.3IONS-SCNC: 10 MMOL/L (ref 9–17)
AST SERPL-CCNC: 47 U/L
BASOPHILS # BLD: 0.03 K/UL (ref 0–0.2)
BASOPHILS NFR BLD: 1 % (ref 0–2)
BILIRUB SERPL-MCNC: 1 MG/DL (ref 0.3–1.2)
BUN SERPL-MCNC: 9 MG/DL (ref 8–23)
BUN/CREAT SERPL: 11 (ref 9–20)
CALCIUM SERPL-MCNC: 9.1 MG/DL (ref 8.6–10.4)
CHLORIDE SERPL-SCNC: 96 MMOL/L (ref 98–107)
CO2 SERPL-SCNC: 26 MMOL/L (ref 20–31)
CREAT SERPL-MCNC: 0.8 MG/DL (ref 0.7–1.2)
EOSINOPHIL # BLD: 0.09 K/UL (ref 0–0.4)
EOSINOPHILS RELATIVE PERCENT: 2 % (ref 0–5)
ERYTHROCYTE [DISTWIDTH] IN BLOOD BY AUTOMATED COUNT: 16.7 % (ref 12.1–15.2)
GFR, ESTIMATED: >90 ML/MIN/1.73M2
GLUCOSE SERPL-MCNC: 95 MG/DL (ref 70–99)
HCT VFR BLD AUTO: 35.9 % (ref 41–53)
HGB BLD-MCNC: 11.9 G/DL (ref 13.5–17.5)
IMM GRANULOCYTES # BLD AUTO: 0 K/UL (ref 0–0.3)
IMM GRANULOCYTES NFR BLD: 0 % (ref 0–5)
LYMPHOCYTES NFR BLD: 1.44 K/UL (ref 1–4.8)
LYMPHOCYTES RELATIVE PERCENT: 34 % (ref 13–44)
MCH RBC QN AUTO: 25.6 PG (ref 26–34)
MCHC RBC AUTO-ENTMCNC: 33.1 G/DL (ref 31–37)
MCV RBC AUTO: 77.4 FL (ref 80–100)
MONOCYTES NFR BLD: 0.44 K/UL (ref 0–1)
MONOCYTES NFR BLD: 11 % (ref 5–9)
MORPHOLOGY: ABNORMAL
NEUTROPHILS NFR BLD: 53 % (ref 39–75)
NEUTS SEG NFR BLD: 2.21 K/UL (ref 2.1–6.5)
PLATELET # BLD AUTO: 97 K/UL (ref 140–450)
PMV BLD AUTO: 9.3 FL (ref 6–12)
POTASSIUM SERPL-SCNC: 3.9 MMOL/L (ref 3.7–5.3)
PROT SERPL-MCNC: 8.4 G/DL (ref 6.4–8.3)
RBC # BLD AUTO: 4.64 M/UL (ref 4.5–5.9)
SODIUM SERPL-SCNC: 132 MMOL/L (ref 135–144)
WBC OTHER # BLD: 4.2 K/UL (ref 3.5–11)

## 2024-10-17 PROCEDURE — 36415 COLL VENOUS BLD VENIPUNCTURE: CPT

## 2024-10-17 PROCEDURE — 80053 COMPREHEN METABOLIC PANEL: CPT

## 2024-10-17 PROCEDURE — 80061 LIPID PANEL: CPT

## 2024-10-17 PROCEDURE — 85025 COMPLETE CBC W/AUTO DIFF WBC: CPT

## 2024-10-17 RX ORDER — PANTOPRAZOLE SODIUM 40 MG/1
40 TABLET, DELAYED RELEASE ORAL DAILY
Qty: 90 TABLET | Refills: 1 | Status: SHIPPED | OUTPATIENT
Start: 2024-10-17

## 2024-10-17 RX ORDER — NAPROXEN 500 MG/1
500 TABLET ORAL 2 TIMES DAILY WITH MEALS
Qty: 180 TABLET | Refills: 2 | Status: SHIPPED | OUTPATIENT
Start: 2024-10-17

## 2024-10-17 RX ORDER — LOSARTAN POTASSIUM AND HYDROCHLOROTHIAZIDE 12.5; 5 MG/1; MG/1
1 TABLET ORAL DAILY
Qty: 90 TABLET | Refills: 1 | Status: SHIPPED | OUTPATIENT
Start: 2024-10-17

## 2024-10-17 SDOH — ECONOMIC STABILITY: FOOD INSECURITY: WITHIN THE PAST 12 MONTHS, THE FOOD YOU BOUGHT JUST DIDN'T LAST AND YOU DIDN'T HAVE MONEY TO GET MORE.: NEVER TRUE

## 2024-10-17 SDOH — ECONOMIC STABILITY: INCOME INSECURITY: HOW HARD IS IT FOR YOU TO PAY FOR THE VERY BASICS LIKE FOOD, HOUSING, MEDICAL CARE, AND HEATING?: NOT VERY HARD

## 2024-10-17 SDOH — ECONOMIC STABILITY: FOOD INSECURITY: WITHIN THE PAST 12 MONTHS, YOU WORRIED THAT YOUR FOOD WOULD RUN OUT BEFORE YOU GOT MONEY TO BUY MORE.: NEVER TRUE

## 2024-10-17 ASSESSMENT — LIFESTYLE VARIABLES
HOW MANY STANDARD DRINKS CONTAINING ALCOHOL DO YOU HAVE ON A TYPICAL DAY: 1 OR 2
HOW OFTEN DO YOU HAVE A DRINK CONTAINING ALCOHOL: 2-3 TIMES A WEEK

## 2024-10-17 ASSESSMENT — PATIENT HEALTH QUESTIONNAIRE - PHQ9
1. LITTLE INTEREST OR PLEASURE IN DOING THINGS: NOT AT ALL
SUM OF ALL RESPONSES TO PHQ QUESTIONS 1-9: 0
SUM OF ALL RESPONSES TO PHQ9 QUESTIONS 1 & 2: 0
2. FEELING DOWN, DEPRESSED OR HOPELESS: NOT AT ALL
SUM OF ALL RESPONSES TO PHQ QUESTIONS 1-9: 0

## 2024-10-17 NOTE — PROGRESS NOTES
Medicare Annual Wellness Visit    Yves Owusu is here for Medicare AWV, Hypertension, and Gastroesophageal Reflux    Assessment & Plan   Initial Medicare annual wellness visit  ----Medicare annual wellness exam completed.    Patient educated about adequate water intake  Patient educated about nutrition  Patient educated about exercise and fitness  Patient educated about fall reduction  Patient educated about importance of advanced directives.    Health maintenance requirements reviewed with patient    Primary osteoarthritis of right hip  The following orders have not been finalized:  -     naproxen (EC NAPROSYN) 500 MG EC tablet  --doing well with naprosyn    Gastroesophageal reflux disease without esophagitis  The following orders have not been finalized:  -     pantoprazole (PROTONIX) 40 MG tablet  --pt doing well with PPI. Continue same.    Thrombocytopenia, unspecified (HCC)  --stable at this time    Umbilical hernia without obstruction and without gangrene  --pt wanting to get hernia fixed but he is leaving for [place] for an indefinite time  Screening for malignant neoplasm of colon  --pt has had hyperplastic polyps in the past during colonoscopy in 2018. Pt advised to have repeat colonoscopy. Pt wants to wait until he gets back from [place]. Pt will call when he returns and order will be placed at that time.     Recommendations for Preventive Services Due: see orders and patient instructions/AVS.  Recommended screening schedule for the next 5-10 years is provided to the patient in written form: see Patient Instructions/AVS.     No follow-ups on file.     Subjective   The following acute and/or chronic problems were also addressed today:    Patient's complete Health Risk Assessment and screening values have been reviewed and are found in Flowsheets. The following problems were reviewed today and where indicated follow up appointments were made and/or referrals ordered.    Positive Risk Factor Screenings with

## 2024-10-17 NOTE — PATIENT INSTRUCTIONS
[employer] disclaims any warranty or liability for your use of this information.      Personalized Preventive Plan for Yves Owusu - 10/17/2024  Medicare offers a range of preventive health benefits. Some of the tests and screenings are paid in full while other may be subject to a deductible, co-insurance, and/or copay.    Some of these benefits include a comprehensive review of your medical history including lifestyle, illnesses that may run in your family, and various assessments and screenings as appropriate.    After reviewing your medical record and screening and assessments performed today your provider may have ordered immunizations, labs, imaging, and/or referrals for you.  A list of these orders (if applicable) as well as your Preventive Care list are included within your After Visit Summary for your review.    Other Preventive Recommendations:    A preventive eye exam performed by an eye specialist is recommended every 1-2 years to screen for glaucoma; cataracts, macular degeneration, and other eye disorders.  A preventive dental visit is recommended every 6 months.  Try to get at least 150 minutes of exercise per week or 10,000 steps per day on a pedometer .  Order or download the FREE \"Exercise & Physical Activity: Your Everyday Guide\" from [employer]. Call 1-978.193.5618 or search [employer] online.  You need 6663-8769 mg of calcium and 6312-4582 IU of vitamin D per day. It is possible to meet your calcium requirement with diet alone, but a vitamin D supplement is usually necessary to meet this goal.  When exposed to the sun, use a sunscreen that protects against both UVA and UVB radiation with an SPF of 30 or greater. Reapply every 2 to 3 hours or after sweating, drying off with a towel, or swimming.  Always wear a seat belt when traveling in a car. Always wear a helmet when riding a bicycle or motorcycle.

## 2024-10-18 LAB
CHOLEST SERPL-MCNC: 123 MG/DL (ref 0–199)
CHOLESTEROL/HDL RATIO: 2
HDLC SERPL-MCNC: 71 MG/DL
LDLC SERPL CALC-MCNC: 40 MG/DL (ref 0–100)
TRIGL SERPL-MCNC: 60 MG/DL
VLDLC SERPL CALC-MCNC: 12 MG/DL

## 2024-10-22 ENCOUNTER — TELEPHONE (OUTPATIENT)
Dept: FAMILY MEDICINE CLINIC | Age: 69
End: 2024-10-22

## 2024-10-22 NOTE — TELEPHONE ENCOUNTER
----- Message from Nav Ram DNP sent at 10/22/2024  1:16 PM EDT -----  Please let patient know that he should be taking a multivitamin with iron, increasing iron containing foods such as dark green leafy vegetables.  Kidney function, liver function are all good.  Cholesterol panel is very good.

## 2025-04-25 DIAGNOSIS — K21.9 GASTROESOPHAGEAL REFLUX DISEASE WITHOUT ESOPHAGITIS: ICD-10-CM

## 2025-04-25 RX ORDER — PANTOPRAZOLE SODIUM 40 MG/1
40 TABLET, DELAYED RELEASE ORAL DAILY
Qty: 90 TABLET | Refills: 1 | Status: SHIPPED | OUTPATIENT
Start: 2025-04-25

## 2025-04-25 RX ORDER — LOSARTAN POTASSIUM AND HYDROCHLOROTHIAZIDE 12.5; 5 MG/1; MG/1
1 TABLET ORAL DAILY
Qty: 90 TABLET | Refills: 1 | Status: SHIPPED | OUTPATIENT
Start: 2025-04-25

## 2025-04-25 NOTE — TELEPHONE ENCOUNTER
Losartan - HCTZ 50-12.5 mg  Protonix 40 mg    Dm-noemí    AWV 10/17/24    Health Maintenance   Topic Date Due    Hepatitis C screen  Never done    DTaP/Tdap/Td vaccine (1 - Tdap) Never done    Pneumococcal 50+ years Vaccine (1 of 2 - PCV) Never done    Shingles vaccine (1 of 2) Never done    Lung Cancer Screening &/or Counseling  07/14/2016    Colorectal Cancer Screen  09/10/2021    AAA screen  Never done    COVID-19 Vaccine (4 - 2024-25 season) 09/01/2024    Annual Wellness Visit (Medicare Advantage)  01/01/2025    Flu vaccine (Season Ended) 08/01/2025    Depression Screen  10/17/2025    Lipids  10/17/2029    Respiratory Syncytial Virus (RSV) Pregnant or age 60 yrs+ (1 - 1-dose 75+ series) 05/12/2032    Hepatitis A vaccine  Aged Out    Hepatitis B vaccine  Aged Out    Hib vaccine  Aged Out    Polio vaccine  Aged Out    Meningococcal (ACWY) vaccine  Aged Out    Meningococcal B vaccine  Aged Out             (applicable per patient's age: Cancer Screenings, Depression Screening, Fall Risk Screening, Immunizations)    AST (U/L)   Date Value   10/17/2024 47 (H)     ALT (U/L)   Date Value   10/17/2024 28     BUN (mg/dL)   Date Value   10/17/2024 9      (goal A1C is < 7)   (goal LDL is <100) need 30-50% reduction from baseline     BP Readings from Last 3 Encounters:   10/17/24 132/80   11/21/22 138/80   10/21/21 128/82    (goal /80)      All Future Testing planned in [facility]:  Lab Frequency Next Occurrence       Next Visit Date:  No future appointments.         Patient Active Problem List:     Guaiac positive stools     Family history of colon cancer     Gastroesophageal reflux disease without esophagitis     History of chronic gastritis     Rectal bleeding     Primary hypertension     Thrombocytopenia, unspecified

## 2025-04-25 NOTE — TELEPHONE ENCOUNTER
Last OV: 10/17/2024  Reflux, Awv  11/21/22 HTN  Last RX:    Next scheduled apt: Visit date not found           Pt requesting a refill   Medication pending for approval

## (undated) DEVICE — Device: Brand: DISPOSABLE ELECTROSURGICAL SNARE

## (undated) DEVICE — REAGENT TEST UREASE RAPD CLOTEST F/

## (undated) DEVICE — GOWN,AURORA,NONRNF,XL,30/CS: Brand: MEDLINE

## (undated) DEVICE — MEDI-VAC NON-CONDUCTIVE SUCTION TUBING 6MM X 6.1M (20 FT.) L: Brand: CARDINAL HEALTH

## (undated) DEVICE — TRAP SUCT POLYPECTOMY ST 4 CHMBR

## (undated) DEVICE — ELECTRODE ES AD CRD L15FT DISP FOR PT BELOW 30LB REM

## (undated) DEVICE — 1200CC SUCTION CANISTER WITH HYDROPHOBIC FILTER AND RED LID: Brand: BEMIS

## (undated) DEVICE — FORCEPS BX L240CM JAW DIA2.2MM RAD JAW 4 HOT DISP

## (undated) DEVICE — JELLY LUBRICATING 4OZ FLIP TOP TB E Z

## (undated) DEVICE — BLOCK BITE AD OPN SZ 48FR MOUTHPC ENDOSCP STURDY W/ FOAM

## (undated) DEVICE — FORCEP SPEC RETRV BX AD 2 MMX155 CM 5 MM GI OVL CUP W/ NDL